# Patient Record
Sex: MALE | Race: WHITE | NOT HISPANIC OR LATINO | Employment: OTHER | URBAN - METROPOLITAN AREA
[De-identification: names, ages, dates, MRNs, and addresses within clinical notes are randomized per-mention and may not be internally consistent; named-entity substitution may affect disease eponyms.]

---

## 2019-01-22 ENCOUNTER — OFFICE VISIT (OUTPATIENT)
Dept: PODIATRY | Facility: CLINIC | Age: 79
End: 2019-01-22
Payer: MEDICARE

## 2019-01-22 VITALS
BODY MASS INDEX: 29.35 KG/M2 | TEMPERATURE: 98 F | HEART RATE: 57 BPM | SYSTOLIC BLOOD PRESSURE: 121 MMHG | HEIGHT: 70 IN | DIASTOLIC BLOOD PRESSURE: 68 MMHG | WEIGHT: 205 LBS

## 2019-01-22 DIAGNOSIS — R20.0 LACK OF SENSATION: ICD-10-CM

## 2019-01-22 DIAGNOSIS — E11.49 TYPE II DIABETES MELLITUS WITH NEUROLOGICAL MANIFESTATIONS: ICD-10-CM

## 2019-01-22 DIAGNOSIS — M19.90 ARTHRITIS: ICD-10-CM

## 2019-01-22 DIAGNOSIS — B35.1 ONYCHOMYCOSIS DUE TO DERMATOPHYTE: ICD-10-CM

## 2019-01-22 PROCEDURE — 99214 PR OFFICE/OUTPT VISIT, EST, LEVL IV, 30-39 MIN: ICD-10-PCS | Mod: S$PBB,,, | Performed by: PODIATRIST

## 2019-01-22 PROCEDURE — 99999 PR PBB SHADOW E&M-NEW PATIENT-LVL III: ICD-10-PCS | Mod: PBBFAC,,, | Performed by: PODIATRIST

## 2019-01-22 PROCEDURE — 99203 OFFICE O/P NEW LOW 30 MIN: CPT | Mod: PBBFAC | Performed by: PODIATRIST

## 2019-01-22 PROCEDURE — 99214 OFFICE O/P EST MOD 30 MIN: CPT | Mod: S$PBB,,, | Performed by: PODIATRIST

## 2019-01-22 PROCEDURE — 99999 PR PBB SHADOW E&M-NEW PATIENT-LVL III: CPT | Mod: PBBFAC,,, | Performed by: PODIATRIST

## 2019-01-22 RX ORDER — PRAVASTATIN SODIUM 40 MG/1
TABLET ORAL
COMMUNITY
End: 2019-01-22

## 2019-01-22 RX ORDER — PRAVASTATIN SODIUM 40 MG/1
40 TABLET ORAL DAILY
COMMUNITY
End: 2021-05-15 | Stop reason: SDUPTHER

## 2019-01-22 RX ORDER — SOTALOL HYDROCHLORIDE 80 MG/1
TABLET ORAL
COMMUNITY
Start: 2018-12-12

## 2019-01-22 RX ORDER — OLMESARTAN MEDOXOMIL 40 MG/1
TABLET ORAL
COMMUNITY
Start: 2018-11-12 | End: 2019-10-22

## 2019-01-22 RX ORDER — BLOOD SUGAR DIAGNOSTIC
STRIP MISCELLANEOUS
Refills: 2 | COMMUNITY
Start: 2019-01-03

## 2019-01-23 NOTE — PROGRESS NOTES
Subjective:      Patient ID: Aaron Johnson is a 78 y.o. male.    Chief Complaint: Diabetic Foot Exam  Patient presents for annual diabetic foot exam.  Relates his diabetes has been doing well, sees Dr. Blackmon, endocrinologist in [place], Dr Augustin locally.  Reports over 20 year history of diabetes.   States it is well controlled, A1c was 6.2.  Checks glucose at home twice a day, last checked was   102.  Does report it can be up in the morning near 160. Has discussed this with his endocrinologist   and was told as long as A1c stays around 6 high morning readings are not a concern.  Denies   burning or tingling in his feet    ROS     Constitutional    Pleasant, well-nourished, no distress, well oriented    Cardiovascular          No chest pain, no shortness of breath    Respiratory         No cough, no congestion     Musculoskeletal        YES arthralgias/joint pain, no back pain, no swelling in the extremities    Neurologic         No weakness, no numbness    Psychiatric   No depression, no anxiety    Endocrine         DIABETES          Objective:      Physical Exam  Vascular         Arterial Pulses Right: posterior tibialis 2/4, dorsalis pedis 2/4, normal CFT   Arterial Pulses Left: posterior tibialis 2/4, dorsalis pedis 2/4, normal CFT   No lower extremity edema bilateral   Pedal skin temperature and color are normal bilateral     Integumentary   Skin is mildly dry, light cracks and peeling, no evidence of tinea pedis, web spaces are clear  No skin breaks, bruises, abrasions bilateral feet  Moderate onychomycosis, several dystrophic, few tender  due to thickness.  Some discoloration,       few reduced thickness, no evidence of ingrown nail, infection or subungual abscess          Neurological   Gross sensation diminished distal digits, intact in all other areas bilateral feet with [place]             [place] monofilament testing.   No paresthesias    Musculoskeletal   Muscle Strength/Testing and Tone:   Intact, normal tone bilateral   Joints, Bones, and Muscles:  Limited range of motion, 1st MPJ, midfoot consistent with arthritic        and degenerative changes        Walks well unassisted          Assessment:       Encounter Diagnoses   Name Primary?    Type II diabetes mellitus with neurological manifestations     Arthritis     Lack of sensation     Onychomycosis due to dermatophyte          Plan:       Aaron MOORE was seen today for diabetic foot exam.    Diagnoses and all orders for this visit:    Type II diabetes mellitus with neurological manifestations    Arthritis    Lack of sensation    Onychomycosis due to dermatophyte        Diabetic pedal exam performed.  Reviewed diabetic education.  We had a lengthy discussion regarding neuropathy, lack of sensation to distal digits, typical for even a   well-controlled diabetic after 20 years.  Advised patient this is a form of neuropathy.  Explained loss of   sensation puts him at risk.  Discussed shoes for indoors to protect his feet and importance of daily foot   checks.   Discussed benefit of tight(er) control of glucose/diabetes regarding potential foot problems especially   neuropathy.  Reviewed no flat shoes, slippers or walking in sock or bare feet.  Discussed maintenance   of dry skin and nails and potential complications.  Reviewed need for daily foot checks and instructed   patient to contact the office with any area of redness or swelling which has not improved within 3 days.  Counseled the patient on his conditions, their implications and medical management.  Instructed patient to contact the office with any changes, questions, concerns, worsening of symptoms.   Patient verbalized understanding.   Total face to face time, exam, assessment, treatment, discussion, documentation 25 minutes, more   than half this time spent on consultation and coordination of care.   Follow up 3 months.      This note was created using [employer] voice recognition software  that occasionally misinterpreted phrases   or words.

## 2019-04-23 ENCOUNTER — OFFICE VISIT (OUTPATIENT)
Dept: PODIATRY | Facility: CLINIC | Age: 79
End: 2019-04-23
Payer: MEDICARE

## 2019-04-23 VITALS
HEIGHT: 70 IN | WEIGHT: 205 LBS | DIASTOLIC BLOOD PRESSURE: 69 MMHG | HEART RATE: 54 BPM | BODY MASS INDEX: 29.35 KG/M2 | RESPIRATION RATE: 18 BRPM | OXYGEN SATURATION: 99 % | SYSTOLIC BLOOD PRESSURE: 122 MMHG | TEMPERATURE: 98 F

## 2019-04-23 DIAGNOSIS — R20.0 LACK OF SENSATION: ICD-10-CM

## 2019-04-23 DIAGNOSIS — M19.90 ARTHRITIS: ICD-10-CM

## 2019-04-23 DIAGNOSIS — E11.49 TYPE II DIABETES MELLITUS WITH NEUROLOGICAL MANIFESTATIONS: Primary | ICD-10-CM

## 2019-04-23 DIAGNOSIS — B35.1 ONYCHOMYCOSIS DUE TO DERMATOPHYTE: ICD-10-CM

## 2019-04-23 PROCEDURE — 99213 OFFICE O/P EST LOW 20 MIN: CPT | Mod: S$PBB,,, | Performed by: PODIATRIST

## 2019-04-23 PROCEDURE — 99999 PR PBB SHADOW E&M-EST. PATIENT-LVL III: CPT | Mod: PBBFAC,,, | Performed by: PODIATRIST

## 2019-04-23 PROCEDURE — 99213 PR OFFICE/OUTPT VISIT, EST, LEVL III, 20-29 MIN: ICD-10-PCS | Mod: S$PBB,,, | Performed by: PODIATRIST

## 2019-04-23 PROCEDURE — 99999 PR PBB SHADOW E&M-EST. PATIENT-LVL III: ICD-10-PCS | Mod: PBBFAC,,, | Performed by: PODIATRIST

## 2019-04-23 PROCEDURE — 99213 OFFICE O/P EST LOW 20 MIN: CPT | Mod: PBBFAC | Performed by: PODIATRIST

## 2019-04-24 NOTE — PROGRESS NOTES
Subjective:      Patient ID: Aaron Johnson is a 79 y.o. male.    Chief Complaint: Diabetic Foot Exam  Patient presents for diabetic foot exam, pain in big toes. Does not a lot of feeling in his toes which we discussed last visit but pain around area of the nails especially on both big toes.  Reports   Recent visit with his endocrinologist, A1c 6.4.  Reports glucose at home have been very well controlled recently 130s in the morning,  in the evening.  Patient has a 20 year history of type 2 diabetes, checks his glucose twice a day      ROS     Constitutional    Pleasant, well-nourished, no distress, well oriented    Cardiovascular          No chest pain, no shortness of breath    Respiratory         No cough, no congestion     Musculoskeletal        YES arthralgias/joint pain, no back pain, no swelling in the extremities    Endocrine         DIABETES        Objective:      Physical Exam  Vascular         Arterial Pulses Right: posterior tibialis 2/4, dorsalis pedis 2/4, normal CFT   Arterial Pulses Left: posterior tibialis 2/4, dorsalis pedis 2/4, normal CFT   No lower extremity edema bilateral   Pedal skin temperature and color are normal bilateral     Integumentary   Hypertrophic 1st MPJ bilateral   Mild dry skin texture, small cracks, stable bilateral   No skin breaks, bruises, abrasions bilateral feet  Moderate onychomycosis, several dystrophic, few tender due to thickness especially hallux with some discoloration, reduced thickness, no evidence of ingrown nail, infection or subungual abscess, at risk           Neurological   Gross sensation diminished distal digits, intact elsewhere, no paresthesias bilateral     Musculoskeletal   Muscle Strength/Testing and Tone:  Intact, normal tone bilateral   Joints, Bones, and Muscles:  Limited range of motion, 1st MPJ, midfoot consistent with arthritic and degenerative changes        Walks well unassisted        Assessment:       Encounter Diagnoses   Name  Primary?    Type II diabetes mellitus with neurological manifestations Yes    Arthritis     Lack of sensation     Onychomycosis due to dermatophyte          Plan:       Aaron MOORE was seen today for diabetic foot exam.    Diagnoses and all orders for this visit:    Type II diabetes mellitus with neurological manifestations    Arthritis    Lack of sensation    Onychomycosis due to dermatophyte      Diabetic pedal exam performed.  Reviewed diabetic education, neuropathy. Reviewed benefit of tight control of glucose/diabetes regarding potential foot problems especially neuropathy.  Reviewed arthritis appropriate shoes,  especially indoors to protect feet, no flat shoes, slippers or walking in sock or bare feet.  Discussed maintenance of skin and nails and potential complications.  Discussed treatments to start immediately should toe become red, swollen or painful. Reviewed need for daily foot checks and instructed patient to contact the office with any area of redness or swelling which has not improved within 3 days.  Counseled the patient on his conditions, their implications and medical management.  Instructed patient to contact the office with any changes, questions, concerns, worsening of symptoms.   Patient verbalized understanding.   Total face to face time, exam, assessment, treatment, discussion, documentation 25 minutes, more than half this time spent on consultation and coordination of care.   Follow up 3 months.      This note was created using [employer] voice recognition software that occasionally misinterpreted phrases or words.

## 2019-07-23 ENCOUNTER — OFFICE VISIT (OUTPATIENT)
Dept: PODIATRY | Facility: CLINIC | Age: 79
End: 2019-07-23
Payer: MEDICARE

## 2019-07-23 DIAGNOSIS — E11.49 TYPE II DIABETES MELLITUS WITH NEUROLOGICAL MANIFESTATIONS: Primary | ICD-10-CM

## 2019-07-23 DIAGNOSIS — L84 FOOT CALLUS: ICD-10-CM

## 2019-07-23 DIAGNOSIS — M19.90 ARTHRITIS: ICD-10-CM

## 2019-07-23 DIAGNOSIS — R20.0 LACK OF SENSATION: ICD-10-CM

## 2019-07-23 DIAGNOSIS — B35.1 ONYCHOMYCOSIS DUE TO DERMATOPHYTE: ICD-10-CM

## 2019-07-23 PROCEDURE — 99211 OFF/OP EST MAY X REQ PHY/QHP: CPT | Mod: PBBFAC | Performed by: PODIATRIST

## 2019-07-23 PROCEDURE — 99999 PR PBB SHADOW E&M-EST. PATIENT-LVL I: ICD-10-PCS | Mod: PBBFAC,,, | Performed by: PODIATRIST

## 2019-07-23 PROCEDURE — 99999 PR PBB SHADOW E&M-EST. PATIENT-LVL I: CPT | Mod: PBBFAC,,, | Performed by: PODIATRIST

## 2019-07-23 PROCEDURE — 99214 OFFICE O/P EST MOD 30 MIN: CPT | Mod: S$PBB,,, | Performed by: PODIATRIST

## 2019-07-23 PROCEDURE — 99214 PR OFFICE/OUTPT VISIT, EST, LEVL IV, 30-39 MIN: ICD-10-PCS | Mod: S$PBB,,, | Performed by: PODIATRIST

## 2019-07-23 NOTE — LETTER
July 23, 2019      Bird Augustin MD  [address]  [facility] [place] [address]           [facility]  [address] [place] [address]  Phone: [phone]  Fax: [phone]          Patient: Aaron Johnson   MR Number: [id number]   YOB: 1940   Date of Visit: 7/23/2019       Dear Dr. Bird Augustin:    Thank you for referring Aaron Johnson to me for evaluation. Attached you will find relevant portions of my assessment and plan of care.    If you have questions, please do not hesitate to call me. I look forward to following Aaron Johnson along with you.    Sincerely,    Jennifer Garcia, DPFAITH    Enclosure  CC:  No Recipients    If you would like to receive this communication electronically, please contact [email or web address] or [phone] to request more information on [employer] Link access.    For providers and/or their staff who would like to refer a patient to [facility], please contact us through our one-stop-shop provider referral line, [facility], at [phone].    If you feel you have received this communication in error or would no longer like to receive these types of communications, please e-mail [email or web address]

## 2019-07-23 NOTE — PROGRESS NOTES
Subjective:      Patient ID: Aaron Johnson is a 79 y.o. male.    Chief Complaint: Diabetic Foot Exam  Patient presents for follow up due to type II diabetes, loss of sensation in feet.  Patient reports no change regarding diabetes since last visit.  Is not aware of his A1c.  Reports glucose this morning was 145, typically it is elevated in the morning.  States glucose ranges are better in the evening, usually .        ROS  Constitutional    Pleasant, well-nourished, no distress, well oriented    Cardiovascular          No chest pain, no shortness of breath    Respiratory         No cough, no congestion     Musculoskeletal        YES arthralgias/joint pain, no swelling in the extremities    Endocrine         DIABETES X 20 YRS        Objective:      Physical Exam   Cardiovascular:   Pulses:       Dorsalis pedis pulses are 2+ on the right side, and 2+ on the left side.        Posterior tibial pulses are 2+ on the right side, and 2+ on the left side.   Musculoskeletal:        Right foot: There is decreased range of motion.        Left foot: There is decreased range of motion.   Feet:   Right Foot:   Protective Sensation: 6 sites tested. 2 sites sensed.   Skin Integrity: Positive for callus.   Left Foot:   Protective Sensation: 6 sites tested. 2 sites sensed.   Skin Integrity: Positive for callus.   Vascular   Normal CFT   No lower extremity edema bilateral   Pedal skin temperature and color are normal bilateral     Integumentary         Hyperkeratotic tissue sub 5th MPJ right foot, medial IPJ left hallux, no discoloration upon debridement of these areas        Dry skin texture   No skin breaks, bruises, abrasions bilateral feet  Moderate onychomycosis, several dystrophic,most severe right hallux, is slowly improving, reduced thickness, no evidence of ingrown nail, infection or subungual abscess, at risk           Neurological   Gross sensation diminished distal digits, intact elsewhere, no paresthesias  bilateral     Musculoskeletal   Muscle Strength/Testing and Tone:  Intact, normal tone bilateral   Joints, Bones, and Muscles:  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    Type II diabetes mellitus with neurological manifestations Yes    Arthritis     Lack of sensation     Foot callus     Onychomycosis due to dermatophyte          Plan:       Aaron MOORE was seen today for diabetic foot exam.    Diagnoses and all orders for this visit:    Type II diabetes mellitus with neurological manifestations    Arthritis    Lack of sensation    Foot callus    Onychomycosis due to dermatophyte      Diabetic pedal exam performed.    Reviewed diabetic education, neuropathy,  lack of sensation, signs /paresthesias to monitor for,  potential complications  Reviewed benefit of tight control of glucose/diabetes regarding potential foot problems especially neuropathy.    Reviewed arthritis appropriate shoes,  especially indoors to protect feet,   Discussed maintenance of skin, nails, calluses and potential complications. Treated areas.  Discussed treatments to start immediately should toe become red, swollen or painful. Reviewed need for daily foot checks and instructed patient to contact the office with any area of redness or swelling which has not improved within 3 days.  Patient was in understanding and agreement with treatment plan  Counseled the patient on his conditions, their implications and medical management.  Instructed patient to contact the office with any changes, questions, concerns, worsening of symptoms.   Patient verbalized understanding.   Total face to face time, exam, assessment, treatment, discussion, documentation 25 minutes, more than half this time spent on consultation and coordination of care.   Follow up 3 months.      This  note was created using [employer] voice recognition software that occasionally misinterpreted phrases or words.

## 2019-10-22 ENCOUNTER — OFFICE VISIT (OUTPATIENT)
Dept: PODIATRY | Facility: CLINIC | Age: 79
End: 2019-10-22
Payer: MEDICARE

## 2019-10-22 VITALS
BODY MASS INDEX: 29.35 KG/M2 | DIASTOLIC BLOOD PRESSURE: 72 MMHG | WEIGHT: 205 LBS | HEART RATE: 74 BPM | SYSTOLIC BLOOD PRESSURE: 125 MMHG | TEMPERATURE: 98 F | OXYGEN SATURATION: 97 % | HEIGHT: 70 IN

## 2019-10-22 DIAGNOSIS — E11.49 TYPE II DIABETES MELLITUS WITH NEUROLOGICAL MANIFESTATIONS: Primary | ICD-10-CM

## 2019-10-22 DIAGNOSIS — B35.1 ONYCHOMYCOSIS DUE TO DERMATOPHYTE: ICD-10-CM

## 2019-10-22 DIAGNOSIS — R20.0 LACK OF SENSATION: ICD-10-CM

## 2019-10-22 DIAGNOSIS — L84 FOOT CALLUS: ICD-10-CM

## 2019-10-22 PROCEDURE — 99999 PR PBB SHADOW E&M-EST. PATIENT-LVL III: CPT | Mod: PBBFAC,,, | Performed by: PODIATRIST

## 2019-10-22 PROCEDURE — 99999 PR PBB SHADOW E&M-EST. PATIENT-LVL III: ICD-10-PCS | Mod: PBBFAC,,, | Performed by: PODIATRIST

## 2019-10-22 PROCEDURE — 99213 OFFICE O/P EST LOW 20 MIN: CPT | Mod: PBBFAC | Performed by: PODIATRIST

## 2019-10-22 PROCEDURE — 99214 OFFICE O/P EST MOD 30 MIN: CPT | Mod: S$PBB,,, | Performed by: PODIATRIST

## 2019-10-22 PROCEDURE — 99214 PR OFFICE/OUTPT VISIT, EST, LEVL IV, 30-39 MIN: ICD-10-PCS | Mod: S$PBB,,, | Performed by: PODIATRIST

## 2019-10-22 RX ORDER — VALSARTAN 160 MG/1
TABLET ORAL
Refills: 3 | COMMUNITY
Start: 2019-10-14 | End: 2021-05-15

## 2019-10-22 NOTE — LETTER
October 23, 2019      Bird Augustin MD  [address]  [facility] [place] [address]           [facility]  [address] [facility] [place] [address]  Phone: [phone]  Fax: [phone]          Patient: Aaron Johnson   MR Number: [id number]   YOB: 1940   Date of Visit: 10/22/2019       Dear Dr. Bird Augustin:    Thank you for referring Aaron Johnson to me for evaluation. Attached you will find relevant portions of my assessment and plan of care.    If you have questions, please do not hesitate to call me. I look forward to following Aaron Johnson along with you.    Sincerely,    Jennifer Garcia, DPFAITH    Enclosure  CC:  No Recipients    If you would like to receive this communication electronically, please contact [email or web address] or [phone] to request more information on [employer] Link access.    For providers and/or their staff who would like to refer a patient to [facility], please contact us through our one-stop-shop provider referral line, [facility], at [phone].    If you feel you have received this communication in error or would no longer like to receive these types of communications, please e-mail [email or web address]

## 2019-10-23 NOTE — PROGRESS NOTES
Subjective:       Patient ID: Aaron Johnson is a 79 y.o. male.    Chief Complaint: Diabetic Foot Exam (nail trim)  Patient presents for follow-up due to type 2 diabetes with lack of sensation in feet.  Patient has a 30-35 year history type 2 diabetes.  He reports no change in medication, treatment or health since last visit.  States diabetes has been well controlled, on the same medication for many years he checks his glucose twice daily, was 151 this morning.  Reports A1c is 6.3.  He states glucose is always higher in the morning.  He is discuss this with his physician many times who pointed out to him as long as his A1c is tightly controlled no change in medication needs to be done at this time.  Denies any foot pain this morning.      Past Medical History:   Diagnosis Date    Arthritis     Atrial fibrillation     Diabetes mellitus     GERD (gastroesophageal reflux disease)     Hyperlipidemia     Hypertension     Low testosterone     Thyroid disease      Past Surgical History:   Procedure Laterality Date    EYE SURGERY      Cataract     History reviewed. No pertinent family history.      Current Outpatient Medications   Medication Sig Dispense Refill    ACCU-CHEK GUIDE Strp TEST BLOOD SUGAR BID  2    amlodipine (NORVASC) 5 MG tablet Take 5 mg by mouth once daily.       ANTIOX#10/OM3/DHA/EPA/LUT/ZEAX (I-CAPS ORAL) Take 1 tablet by mouth once daily.      BIOTIN ORAL Take 1 tablet by mouth once daily.      calcium carbonate (OS-VIRGILIO) 600 mg (1,500 mg) Tab Take 600 mg by mouth once daily.      multivitamin (ONE DAILY MULTIVITAMIN) per tablet Take 1 tablet by mouth once daily.      pantoprazole (PROTONIX) 40 MG tablet Take 40 mg by mouth once daily.       pravastatin (PRAVACHOL) 40 MG tablet Take 40 mg by mouth once daily.      ranitidine (ZANTAC) 150 MG tablet       rivaroxaban (XARELTO) 20 mg Tab Xarelto 20 mg tablet      SITagliptan-metformin (JANUMET XR) 50-1,000 mg TM24 Janumet XR 50  "mg-1,000 mg tablet,extended release      sotalol (BETAPACE) 80 MG tablet       SYNTHROID 25 mcg tablet Take 25 mcg by mouth before breakfast.       testosterone (ANDROGEL) 1 %(50 mg/5 gram) GlPk Apply 5 g topically once daily.      valsartan (DIOVAN) 160 MG tablet TK 1 T PO D  3     No current facility-administered medications for this visit.      Review of patient's allergies indicates:  No Known Allergies    Review of Systems   Constitutional: Negative for activity change, fatigue and fever.   HENT: Negative for congestion.    Respiratory: Negative for cough and shortness of breath.    Cardiovascular: Negative for leg swelling.   Endocrine:        DIABETES 30/35 YEARS   Musculoskeletal: Negative for gait problem.   Neurological: Positive for numbness.       Objective:      Vitals:    10/22/19 0826   BP: 125/72   Pulse: 74   Temp: 98 °F (36.7 °C)   TempSrc: Oral   SpO2: 97%   Weight: 93 kg (205 lb)   Height: 5' 10" (1.778 m)     Physical Exam   Constitutional: He appears well-developed and well-nourished. No distress.   Cardiovascular:   Pulses:       Dorsalis pedis pulses are 2+ on the right side, and 2+ on the left side.        Posterior tibial pulses are 2+ on the right side, and 2+ on the left side.   Musculoskeletal:        Right foot: There is decreased range of motion.        Left foot: There is decreased range of motion.   Feet:   Right Foot:   Protective Sensation: 6 sites tested. 2 sites sensed.   Skin Integrity: Positive for callus.   Left Foot:   Protective Sensation: 6 sites tested. 2 sites sensed.   Skin Integrity: Positive for callus.   Vitals reviewed.  Vascular   Normal CFT   No lower extremity edema bilateral   Pedal skin temperature and color are normal bilateral     Integumentary         Hyperkeratotic tissue sub 5th MPJ right foot, medial IPJ left hallux, no discoloration upon debridement, stable        Dry skin texture   No skin breaks, bruises, abrasions bilateral feet  Moderate " onychomycosis, several dystrophic, most severe right hallux, raised, discolored. 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 Type II diabetes mellitus with neurological manifestations    2. Lack of sensation    3. Foot callus    4. Onychomycosis due to dermatophyte        Plan:           Lengthy review diabetic education, neuropathy,  lack of sensation,  symptoms to monitor for,  potential complications  Reviewed benefit of continued tight control of glucose/diabetes regarding potential foot problems especially neuropathy.    Reviewed arthritis appropriate shoes,  especially indoors to protect feet,   Discussed maintenance of skin, nails, calluses and potential complications.   Discussed  conservative treatments to start immediately should toe become red, swollen or painful. Reviewed need for daily foot checks and instructed patient to contact the office with any area of redness or swelling which has not improved within 3 days.  Patient was in understanding and agreement with treatment plan  Counseled the patient on his conditions, their implications and medical management.  Instructed patient to contact the office with any changes, questions, concerns, worsening of symptoms.   Patient verbalized understanding.   Total face to face time, exam, assessment, treatment, discussion, documentation 25 minutes, more than half this time spent on consultation and coordination of care.   Follow up 3 months.      This note was created using [employer]  voice recognition software that occasionally misinterpreted phrases or words.

## 2019-10-24 ENCOUNTER — TELEPHONE (OUTPATIENT)
Dept: PODIATRY | Facility: CLINIC | Age: 79
End: 2019-10-24

## 2019-10-24 NOTE — TELEPHONE ENCOUNTER
----- Message from rEica Heck sent at 10/24/2019  1:02 PM CDT -----  Contact: Alexia Whitey  Type:  Patient Returning Call    Who Called:  Alexia Whitey  Who Left Message for Patient:  Not sure  Does the patient know what this is regarding?:  Not sure  Best Call Back Number:  [phone] (home)   Additional Information:  na

## 2019-10-24 NOTE — TELEPHONE ENCOUNTER
----- Message from Erica Heck sent at 10/24/2019  1:02 PM CDT -----  Contact: Alexia Whitey  Type:  Patient Returning Call    Who Called:  Alexia Whitey  Who Left Message for Patient:  Not sure  Does the patient know what this is regarding?:  Not sure  Best Call Back Number:  [phone] (home)   Additional Information:  [id number]

## 2019-10-24 NOTE — TELEPHONE ENCOUNTER
----- Message from Erica Heck sent at 10/24/2019  1:02 PM CDT -----  Contact: Alexia Whitey  Type:  Patient Returning Call    Who Called:  Alexia Whitey  Who Left Message for Patient:  Not sure  Does the patient know what this is regarding?:  Not sure  Best Call Back Number:  [phone] (home)   Additional Information:  [id number]

## 2020-01-21 ENCOUNTER — OFFICE VISIT (OUTPATIENT)
Dept: PODIATRY | Facility: CLINIC | Age: 80
End: 2020-01-21
Payer: MEDICARE

## 2020-01-21 VITALS
SYSTOLIC BLOOD PRESSURE: 126 MMHG | TEMPERATURE: 99 F | BODY MASS INDEX: 29.35 KG/M2 | HEART RATE: 70 BPM | HEIGHT: 70 IN | DIASTOLIC BLOOD PRESSURE: 79 MMHG | WEIGHT: 205 LBS | OXYGEN SATURATION: 95 %

## 2020-01-21 DIAGNOSIS — M19.90 ARTHRITIS: ICD-10-CM

## 2020-01-21 DIAGNOSIS — B35.1 ONYCHOMYCOSIS DUE TO DERMATOPHYTE: ICD-10-CM

## 2020-01-21 DIAGNOSIS — M20.5X9 HALLUX LIMITUS, ACQUIRED, UNSPECIFIED LATERALITY: ICD-10-CM

## 2020-01-21 DIAGNOSIS — E11.49 TYPE II DIABETES MELLITUS WITH NEUROLOGICAL MANIFESTATIONS: Primary | ICD-10-CM

## 2020-01-21 PROCEDURE — 99999 PR PBB SHADOW E&M-EST. PATIENT-LVL III: ICD-10-PCS | Mod: PBBFAC,,, | Performed by: PODIATRIST

## 2020-01-21 PROCEDURE — 99213 OFFICE O/P EST LOW 20 MIN: CPT | Mod: PBBFAC | Performed by: PODIATRIST

## 2020-01-21 PROCEDURE — 1126F PR PAIN SEVERITY QUANTIFIED, NO PAIN PRESENT: ICD-10-PCS | Mod: ,,, | Performed by: PODIATRIST

## 2020-01-21 PROCEDURE — 99999 PR PBB SHADOW E&M-EST. PATIENT-LVL III: CPT | Mod: PBBFAC,,, | Performed by: PODIATRIST

## 2020-01-21 PROCEDURE — 99214 OFFICE O/P EST MOD 30 MIN: CPT | Mod: S$PBB,,, | Performed by: PODIATRIST

## 2020-01-21 PROCEDURE — 99214 PR OFFICE/OUTPT VISIT, EST, LEVL IV, 30-39 MIN: ICD-10-PCS | Mod: S$PBB,,, | Performed by: PODIATRIST

## 2020-01-21 PROCEDURE — 1159F PR MEDICATION LIST DOCUMENTED IN MEDICAL RECORD: ICD-10-PCS | Mod: ,,, | Performed by: PODIATRIST

## 2020-01-21 PROCEDURE — 1126F AMNT PAIN NOTED NONE PRSNT: CPT | Mod: ,,, | Performed by: PODIATRIST

## 2020-01-21 PROCEDURE — 1159F MED LIST DOCD IN RCRD: CPT | Mod: ,,, | Performed by: PODIATRIST

## 2020-01-21 NOTE — LETTER
January 21, 2020      Bird Augustin MD  [address]  [facility] [place] [address]           [facility]ound [facility]  [address] [facility] [place] [address]  Phone: [phone]  Fax: [phone]          Patient: Aaron Johnson   MR Number: [id number]   YOB: 1940   Date of Visit: 1/21/2020       Dear Dr. Bird Augustin:    Thank you for referring Aaron Johnson to me for evaluation. Attached you will find relevant portions of my assessment and plan of care.    If you have questions, please do not hesitate to call me. I look forward to following Aaron Johnson along with you.    Sincerely,    Jennifer Garcia, DPFAITH    Enclosure  CC:  No Recipients    If you would like to receive this communication electronically, please contact [email or web address] or [phone] to request more information on [employer] Link access.    For providers and/or their staff who would like to refer a patient to [facility], please contact us through our one-stop-shop provider referral line, [facility] , at [phone].    If you feel you have received this communication in error or would no longer like to receive these types of communications, please e-mail [email or web address]

## 2020-01-22 NOTE — PROGRESS NOTES
Subjective:       Patient ID: Aaron Johnson is a 79 y.o. male.    Chief Complaint: Diabetic Foot Exam  Patient presents for diabetic foot exam. Patient relates he is doing well. No change in diabetes.  States A1c 6.5. Checks glucose twice a day. Relates glucose was 121 lats night 187 this mornring. Has talked to PCP about morning highs, but was told as long as A1c is good flucuations in the am are okay. Denies burning or tingling.       Past Medical History:   Diagnosis Date    Arthritis     Atrial fibrillation     Diabetes mellitus     GERD (gastroesophageal reflux disease)     Hyperlipidemia     Hypertension     Low testosterone     Thyroid disease      Past Surgical History:   Procedure Laterality Date    EYE SURGERY      Cataract     History reviewed. No pertinent family history.      Current Outpatient Medications   Medication Sig Dispense Refill    ACCU-CHEK GUIDE Strp TEST BLOOD SUGAR BID  2    amlodipine (NORVASC) 5 MG tablet Take 5 mg by mouth once daily.       ANTIOX#10/OM3/DHA/EPA/LUT/ZEAX (I-CAPS ORAL) Take 1 tablet by mouth once daily.      multivitamin (ONE DAILY MULTIVITAMIN) per tablet Take 1 tablet by mouth once daily.      pantoprazole (PROTONIX) 40 MG tablet Take 40 mg by mouth once daily.       pravastatin (PRAVACHOL) 40 MG tablet Take 40 mg by mouth once daily.      ranitidine (ZANTAC) 150 MG tablet       rivaroxaban (XARELTO) 20 mg Tab Xarelto 20 mg tablet      SITagliptan-metformin (JANUMET XR) 50-1,000 mg TM24 Janumet XR 50 mg-1,000 mg tablet,extended release      sotalol (BETAPACE) 80 MG tablet       SYNTHROID 25 mcg tablet Take 25 mcg by mouth before breakfast.       valsartan (DIOVAN) 160 MG tablet TK 1 T PO D  3    BIOTIN ORAL Take 1 tablet by mouth once daily.      calcium carbonate (OS-VIRGILIO) 600 mg (1,500 mg) Tab Take 600 mg by mouth once daily.      testosterone (ANDROGEL) 1 %(50 mg/5 gram) GlPk Apply 5 g topically once daily.       No current  "facility-administered medications for this visit.      Review of patient's allergies indicates:  No Known Allergies    Review of Systems   Constitutional: Negative for fever.   HENT: Negative for congestion.    Respiratory: Negative for cough and shortness of breath.    Cardiovascular: Negative for leg swelling.   Musculoskeletal: Negative for gait problem.   All other systems reviewed and are negative.      Objective:      Vitals:    01/21/20 0927   BP: 126/79   Pulse: 70   Temp: 98.7 °F (37.1 °C)   TempSrc: Oral   SpO2: 95%   Weight: 93 kg (205 lb)   Height: 5' 10" (1.778 m)     Physical Exam   Constitutional: He appears well-developed and well-nourished. No distress.   Cardiovascular:   Pulses:       Dorsalis pedis pulses are 2+ on the right side, and 2+ on the left side.        Posterior tibial pulses are 1+ on the right side, and 1+ on the left side.   Pulmonary/Chest: Effort normal.   Musculoskeletal:        Right foot: There is decreased range of motion.        Left foot: There is decreased range of motion.   Feet:   Right Foot:   Protective Sensation: 6 sites tested. 5 sites sensed.   Skin Integrity: Positive for callus.   Left Foot:   Protective Sensation: 6 sites tested. 6 sites sensed.   Skin Integrity: Positive for callus.   Skin: Capillary refill takes 2 to 3 seconds.   Psychiatric: He has a normal mood and affect.   Nursing note and vitals reviewed.  Vascular   CFT 3s bilateral   No lower extremity edema bilateral   Pedal skin temperature and color are normal bilateral     Integumentary         Hyperkeratotic tissue sub 5th MPJ right foot, medial IPJ left hallux, stable, no discoloration upon debridement, stable        Dry skin texture   No skin breaks, bruises, abrasions bilateral feet  Dystrophic nychomycosis, most severe right hallux, raised, discolored. Reduced thickness, no erythema         Neurological   Gross sensation improved from last year's annual diabetic foot exam.  Patient detected " monofilament on all areas bilateral with the exception of the distal right hallux.  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 Type II diabetes mellitus with neurological manifestations    2. Arthritis    3. Hallux limitus, acquired, unspecified laterality    4. Onychomycosis due to dermatophyte        Plan:         Diabetic foot exam performed.   Reviewed monofilament testing, improved sensation since/year's annual exam and loss of sensation distal right great toe.  Reviewed diabetic education  Reviewed benefit of continued tight control of glucose/diabetes   Reviewed arthritis, limited range of motion,  appropriate shoes,  especially indoors to protect feet  Discussed maintenance of dry skin, nails, calluses and potential complications.   Reviewed  conservative treatments to start immediately should toe become red, swollen or painful.   Reviewed need for daily foot checks and instructed patient to contact the office with any area of redness or swelling which has not improved within 3 days.  Patient was in understanding and agreement with treatment plan  Counseled the patient on his conditions, their implications and medical management.  Instructed patient to contact the office with any changes, questions, concerns, worsening of symptoms. Patient verbalized understanding.   Total face to face time, exam, assessment, treatment, discussion, documentation 25 minutes, more than half this time spent on consultation and coordination of care.   Follow up 3 months.      This note was created using [employer] voice recognition software that occasionally misinterpreted phrases or words.

## 2020-04-17 ENCOUNTER — OFFICE VISIT (OUTPATIENT)
Dept: PODIATRY | Facility: CLINIC | Age: 80
End: 2020-04-17
Payer: MEDICARE

## 2020-04-17 VITALS
DIASTOLIC BLOOD PRESSURE: 74 MMHG | HEART RATE: 70 BPM | BODY MASS INDEX: 29.35 KG/M2 | TEMPERATURE: 98 F | SYSTOLIC BLOOD PRESSURE: 139 MMHG | HEIGHT: 70 IN | WEIGHT: 205 LBS

## 2020-04-17 DIAGNOSIS — M20.5X9 HALLUX LIMITUS, ACQUIRED, UNSPECIFIED LATERALITY: ICD-10-CM

## 2020-04-17 DIAGNOSIS — B35.3 TINEA PEDIS OF BOTH FEET: ICD-10-CM

## 2020-04-17 DIAGNOSIS — B35.1 ONYCHOMYCOSIS DUE TO DERMATOPHYTE: ICD-10-CM

## 2020-04-17 DIAGNOSIS — E11.49 TYPE II DIABETES MELLITUS WITH NEUROLOGICAL MANIFESTATIONS: Primary | ICD-10-CM

## 2020-04-17 DIAGNOSIS — L84 FOOT CALLUS: ICD-10-CM

## 2020-04-17 DIAGNOSIS — M19.90 ARTHRITIS: ICD-10-CM

## 2020-04-17 PROCEDURE — 99999 PR PBB SHADOW E&M-EST. PATIENT-LVL III: CPT | Mod: PBBFAC,,, | Performed by: PODIATRIST

## 2020-04-17 PROCEDURE — 99213 OFFICE O/P EST LOW 20 MIN: CPT | Mod: PBBFAC | Performed by: PODIATRIST

## 2020-04-17 PROCEDURE — 99214 PR OFFICE/OUTPT VISIT, EST, LEVL IV, 30-39 MIN: ICD-10-PCS | Mod: S$PBB,,, | Performed by: PODIATRIST

## 2020-04-17 PROCEDURE — 99999 PR PBB SHADOW E&M-EST. PATIENT-LVL III: ICD-10-PCS | Mod: PBBFAC,,, | Performed by: PODIATRIST

## 2020-04-17 PROCEDURE — 99214 OFFICE O/P EST MOD 30 MIN: CPT | Mod: S$PBB,,, | Performed by: PODIATRIST

## 2020-04-17 RX ORDER — LOSARTAN POTASSIUM 100 MG/1
TABLET ORAL
COMMUNITY
Start: 2020-04-09 | End: 2022-10-20 | Stop reason: SDUPTHER

## 2020-04-17 NOTE — LETTER
April 18, 2020      Bird Augustin MD  [address]  [place] [address]           [facility] - [facility]iatry/[facility]ound [facility]  [address] [facility] [place] [address]  Phone: [phone]  Fax: [phone]          Patient: Aaron Johnson   MR Number: [id number]   YOB: 1940   Date of Visit: 4/17/2020       Dear Dr. Bird Augustin:    Thank you for referring Aaron Johnson to me for evaluation. Attached you will find relevant portions of my assessment and plan of care.    If you have questions, please do not hesitate to call me. I look forward to following Aaron Johnson along with you.    Sincerely,    Jennifer Garcia, DPFAITH    Enclosure  CC:  No Recipients    If you would like to receive this communication electronically, please contact [email or web address] or [phone] to request more information on [employer] Link access.    For providers and/or their staff who would like to refer a patient to [facility], please contact us through our one-stop-shop provider referral line, [facility], at [phone].    If you feel you have received this communication in error or would no longer like to receive these types of communications, please e-mail [email or web address]

## 2020-04-18 NOTE — PROGRESS NOTES
Subjective:       Patient ID: Aaron Johnson is a 80 y.o. male.    Chief Complaint: Diabetes Mellitus  Patient presents for Follow-up due to type 2 diabetes.  Patient reports diabetes has remained well controlled.  Last A1c was 6.4, glucose this morning 132. Patient reports with the stay in shelter order for COVID-19 he has been staying at home in working in the [employer] more.  No change in health or medications since last visit.       Past Medical History:   Diagnosis Date    Arthritis     Atrial fibrillation     Diabetes mellitus     GERD (gastroesophageal reflux disease)     Hyperlipidemia     Hypertension     Low testosterone     Thyroid disease      Past Surgical History:   Procedure Laterality Date    EYE SURGERY      Cataract     History reviewed. No pertinent family history.      Current Outpatient Medications   Medication Sig Dispense Refill    ACCU-CHEK GUIDE Strp TEST BLOOD SUGAR BID  2    amlodipine (NORVASC) 5 MG tablet Take 5 mg by mouth once daily.       ANTIOX#10/OM3/DHA/EPA/LUT/ZEAX (I-CAPS ORAL) Take 1 tablet by mouth once daily.      BIOTIN ORAL Take 1 tablet by mouth once daily.      calcium carbonate (OS-VIRGILIO) 600 mg (1,500 mg) Tab Take 600 mg by mouth once daily.      losartan (COZAAR) 100 MG tablet       multivitamin (ONE DAILY MULTIVITAMIN) per tablet Take 1 tablet by mouth once daily.      pantoprazole (PROTONIX) 40 MG tablet Take 40 mg by mouth once daily.       pravastatin (PRAVACHOL) 40 MG tablet Take 40 mg by mouth once daily.      ranitidine (ZANTAC) 150 MG tablet       rivaroxaban (XARELTO) 20 mg Tab Xarelto 20 mg tablet      SITagliptan-metformin (JANUMET XR) 50-1,000 mg TM24 Janumet XR 50 mg-1,000 mg tablet,extended release      sotalol (BETAPACE) 80 MG tablet       SYNTHROID 25 mcg tablet Take 25 mcg by mouth before breakfast.       testosterone (ANDROGEL) 1 %(50 mg/5 gram) GlPk Apply 5 g topically once daily.      valsartan (DIOVAN) 160 MG tablet TK 1 T PO D  " 3     No current facility-administered medications for this visit.      Review of patient's allergies indicates:  No Known Allergies    Review of Systems   Constitutional: Negative for fever.   HENT: Negative for congestion.    Respiratory: Negative for cough and shortness of breath.    Cardiovascular: Negative for leg swelling.   Musculoskeletal: Negative for gait problem.   All other systems reviewed and are negative.      Objective:      Vitals:    04/17/20 0842   BP: 139/74   Pulse: 70   Temp: 98.3 °F (36.8 °C)   Weight: 93 kg (205 lb)   Height: 5' 10" (1.778 m)     Physical Exam   Constitutional: He appears well-developed and well-nourished. No distress.   Cardiovascular:   Pulses:       Dorsalis pedis pulses are 2+ on the right side, and 2+ on the left side.        Posterior tibial pulses are 1+ on the right side, and 1+ on the left side.   Pulmonary/Chest: Effort normal.   Musculoskeletal:        Right foot: There is decreased range of motion.        Left foot: There is decreased range of motion.   Feet:   Right Foot:   Protective Sensation: 4 sites tested. 4 sites sensed.   Skin Integrity: Positive for erythema and dry skin.   Left Foot:   Protective Sensation: 4 sites tested. 4 sites sensed.   Skin Integrity: Positive for erythema and dry skin.   Skin: Capillary refill takes 2 to 3 seconds.   Psychiatric: He has a normal mood and affect.   Nursing note and vitals reviewed.  Vascular   Normal CFT for age bilateral   No lower extremity edema bilateral   Pedal skin temperature and color are normal bilateral     Integumentary         Light erythema with dry peeling skin in a moccasin type distribution consistent with tinea pedis bilateral.  Web spaces are clear and areas of edema or calor        Mild non tender hyperkeratotic tissue sub 5th MPJ right foot, medial IPJ left hallux, stable, no discoloration upon debridement  No skin breaks, bruises, abrasions bilateral feet  Dystrophic onychomycosis, worse on the " right foot, most severe right hallux. Reduced thickness, no erythema         Neurological   Gross sensation iintact bilateral feet with exception of the distal right hallux.  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 Type II diabetes mellitus with neurological manifestations    2. Tinea pedis of both feet    3. Hallux limitus, acquired, unspecified laterality    4. Arthritis    5. Foot callus    6. Onychomycosis due to dermatophyte        Plan:           Reviewed diabetic education  Reviewed A1c, daily glucose, benefit of continued tight control of glucose/diabetes   Reviewed arthritis, limited range of motion,  appropriate shoes to accommodate these areas and appropriate shoes indoors to protect feet  Discussed maintenance of dry skin, nails, calluses and potential complications.   Pointed out athlete's foot throughout the bottom of both feet and instructed patient to utilize over-the-counter antifungal cream once daily.  Make sure it is thoroughly absorbed into the skin and dry before applying socks and shoes, avoid getting between the toes.   Explained this is most likely due to amount of time he has been working outside and when he comes into the house he should apply powder to his feet to absorb any moisture and change his socks.  Reviewed potential complications due to fungal nails, conservative treatments to start immediately should toe become red, swollen or painful.   Reviewed need for daily foot checks and instructed patient to contact the office with any area of redness or swelling which has not improved within 3 days.  Patient was in understanding and agreement with treatment plan  Counseled the  patient on his conditions, their implications and medical management.  Instructed patient to contact the office with any changes, questions, concerns, worsening of symptoms. Patient verbalized understanding.   Total face to face time, exam, assessment, treatment, discussion, documentation 25 minutes, more than half this time spent on consultation and coordination of care.   Follow up 3 months.      This note was created using [employer] voice recognition software that occasionally misinterpreted phrases or words.

## 2020-09-06 ENCOUNTER — HOSPITAL ENCOUNTER (EMERGENCY)
Facility: HOSPITAL | Age: 80
Discharge: HOME | End: 2020-09-06
Attending: EMERGENCY MEDICINE
Payer: MEDICARE

## 2020-09-06 VITALS
HEART RATE: 70 BPM | RESPIRATION RATE: 18 BRPM | BODY MASS INDEX: 28.63 KG/M2 | TEMPERATURE: 99 F | SYSTOLIC BLOOD PRESSURE: 129 MMHG | DIASTOLIC BLOOD PRESSURE: 79 MMHG | OXYGEN SATURATION: 96 % | HEIGHT: 70 IN | WEIGHT: 200 LBS
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
## 2020-09-06 PROCEDURE — 90714 TD VACC NO PRESV 7 YRS+ IM: CPT | Performed by: NURSE PRACTITIONER

## 2020-09-06 PROCEDURE — 99283 EMERGENCY DEPT VISIT LOW MDM: CPT | Mod: 25

## 2020-09-06 PROCEDURE — 63600175 PHARM REV CODE 636 W HCPCS: Performed by: NURSE PRACTITIONER

## 2020-09-06 PROCEDURE — 90471 IMMUNIZATION ADMIN: CPT | Performed by: NURSE PRACTITIONER

## 2020-09-06 RX ORDER — AMOXICILLIN AND CLAVULANATE POTASSIUM 875; 125 MG/1; MG/1
1 TABLET, FILM COATED ORAL 2 TIMES DAILY
Qty: 20 TABLET | Refills: 0 | Status: SHIPPED | OUTPATIENT
Start: 2020-09-06 | End: 2020-09-16

## 2020-09-06 RX ADMIN — TETANUS AND DIPHTHERIA TOXOIDS ADSORBED 0.5 ML: 2; 2 INJECTION INTRAMUSCULAR at 01:09

## 2020-09-06 NOTE — ED NOTES
[facility]. Spoke with Fide Thomas: 54.     Reported that a small mid sized white and tan dog bit pt at his residence in the yard at approximately 1250 today.   Pt residence: [address], [place], [address].     Marcella asks for the patient to call [place] [employer] when he returns home so a report can be made. Relayed information to pt.

## 2020-09-06 NOTE — ED PROVIDER NOTES
Encounter Date: 2020       History     Chief Complaint   Patient presents with    Animal Bite     neighbors dog bit him     80-year-old male presents to ER from home for concerns of dog bite to his left hand, left forearm & right hand PTA; patient states that the neighbor's dog attacked his cat and he attempted to rescue the cat no prescription or OTC medications taken    Past medical/surgical history, allergies & current medications reviewed with patient -- unsure of last tetanus    Known SARS-CoV2 exposure:  No    Room:  11      The history is provided by the patient. No  was used.     Review of patient's allergies indicates:  No Known Allergies  Past Medical History:   Diagnosis Date    Arthritis     Atrial fibrillation     Diabetes mellitus     GERD (gastroesophageal reflux disease)     Hyperlipidemia     Hypertension     Low testosterone     Thyroid disease      Past Surgical History:   Procedure Laterality Date    EYE SURGERY      Cataract     History reviewed. No pertinent family history.  Social History     Tobacco Use    Smoking status: Former Smoker     Years: 10.00     Types: Cigarettes     Quit date: 2/10/1973     Years since quittin.6    Smokeless tobacco: Never Used   Substance Use Topics    Alcohol use: Yes     Comment: scotch 3-4 times a week    Drug use: Not on file     Review of Systems   Constitutional: Negative for fever.   HENT: Negative for sore throat.    Respiratory: Negative for shortness of breath.    Cardiovascular: Negative for chest pain.   Gastrointestinal: Negative for nausea.   Genitourinary: Negative for dysuria.   Musculoskeletal: Negative for back pain.   Skin: Positive for wound. Negative for rash.   Neurological: Negative for weakness.   Hematological: Does not bruise/bleed easily.   All other systems reviewed and are negative.      Physical Exam     Initial Vitals [20 1317]   BP Pulse Resp Temp SpO2   (!) 151/82 75 20 98.5 °F  (36.9 °C) 98 %      MAP       --         Physical Exam    Nursing note and vitals reviewed.  Constitutional: He appears well-developed. He does not appear ill. No distress.   Afebrile, vital signs stable   HENT:   Head: Normocephalic and atraumatic.   Right Ear: External ear normal.   Left Ear: External ear normal.   Nose: Nose normal.   Eyes: Lids are normal.   Neck: Neck supple.   Cardiovascular: Normal rate.   Pulmonary/Chest: Effort normal. No respiratory distress.   Abdominal: He exhibits no distension.   Musculoskeletal:        Arms:         Hands:    Neurological: He is alert.   Skin: No rash noted.   Psychiatric: He has a normal mood and affect.         ED Course   Procedures  Labs Reviewed - No data to display       Imaging Results    None          Medical Decision Making:   ED Management:  We extensively scrubbed and cleaned the wound sites applying a clean sterile dressing, there are no wounds that needed to be repaired, they will heal by secondary intent    Medications given:  Tetanus    Findings, diagnosis & plan of care discussed with patient:  Dog bite; we will prophylactically cover patient with Augmentin, care instructions given -- further instructions given to follow-up with PCP this week    All questions answered, strict return precautions given, patient verbalized understanding to all instructions, pleasant visit -- vital signs are stable & patient is in no distress at discharge    Disclaimer:  This note was prepared with [employer] voice recognition transcription software. Garbled syntax, mangled pronouns, and other bizarre constructions may be attributed to that software system.  Should there be any questions do not hesitate to contact me for clarification.                                   Clinical Impression:       ICD-10-CM ICD-9-CM   1. Dog bite, initial encounter  W54.0XXA 879.8     E906.0             ED Disposition Condition    Discharge Stable        ED Prescriptions      Medication Sig Dispense Start Date End Date Auth. Provider    amoxicillin-clavulanate 875-125mg (AUGMENTIN) 875-125 mg per tablet Take 1 tablet by mouth 2 (two) times daily. for 10 days 20 tablet 9/6/2020 9/16/2020 Lew Mccurdy NP        Follow-up Information     Follow up With Specialties Details Why Contact Info    Bird Augustin MD Family Medicine Go to  This week for follow-up [address]  [facility] [address]  [phone]                                       Lew Mccurdy NP  09/06/20 1321       Lew Mccurdy NP  09/06/20 135       Lew Mccurdy NP  09/06/20 3942

## 2020-09-06 NOTE — DISCHARGE INSTRUCTIONS
Complete all antibiotics as prescribed.  Take OTC Tylenol as needed for pain.  Take OTC probiotic daily or eat 1 small cup of yogurt daily while on antibiotics.  Drink fluids, eat diet as tolerated & rest.    Take all medications as prescribed.      Download the [employer] zulay to access your health records & test results.  Please remember that you had a visit to the emergency room today and this does not substitute as primary care services for ongoing management because emergency services is a snap shot in time.  Should you have any worsening condition that requires emergency services do not hesitate to return to the ER.    COVID-19 TESTING  Hot Line [phone]  [address] [place] [address]  [facility]  Hours: 8am-5pm Monday - Friday   8am-noon Saturday - Sunday

## 2020-10-15 ENCOUNTER — OFFICE VISIT (OUTPATIENT)
Dept: PODIATRY | Facility: CLINIC | Age: 80
End: 2020-10-15
Payer: MEDICARE

## 2020-10-15 VITALS
OXYGEN SATURATION: 99 % | TEMPERATURE: 98 F | DIASTOLIC BLOOD PRESSURE: 75 MMHG | SYSTOLIC BLOOD PRESSURE: 138 MMHG | WEIGHT: 200 LBS | RESPIRATION RATE: 18 BRPM | HEIGHT: 70 IN | HEART RATE: 70 BPM | BODY MASS INDEX: 28.63 KG/M2

## 2020-10-15 DIAGNOSIS — B35.1 ONYCHOMYCOSIS DUE TO DERMATOPHYTE: ICD-10-CM

## 2020-10-15 DIAGNOSIS — E11.9 TYPE II DIABETES MELLITUS, WELL CONTROLLED: Primary | ICD-10-CM

## 2020-10-15 DIAGNOSIS — L84 FOOT CALLUS: ICD-10-CM

## 2020-10-15 DIAGNOSIS — B35.3 TINEA PEDIS OF BOTH FEET: ICD-10-CM

## 2020-10-15 DIAGNOSIS — M20.5X9 HALLUX LIMITUS, ACQUIRED, UNSPECIFIED LATERALITY: ICD-10-CM

## 2020-10-15 PROCEDURE — 99214 OFFICE O/P EST MOD 30 MIN: CPT | Mod: PBBFAC | Performed by: PODIATRIST

## 2020-10-15 PROCEDURE — 99999 PR PBB SHADOW E&M-EST. PATIENT-LVL IV: CPT | Mod: PBBFAC,,, | Performed by: PODIATRIST

## 2020-10-15 PROCEDURE — 99999 PR PBB SHADOW E&M-EST. PATIENT-LVL IV: ICD-10-PCS | Mod: PBBFAC,,, | Performed by: PODIATRIST

## 2020-10-15 PROCEDURE — 99213 PR OFFICE/OUTPT VISIT, EST, LEVL III, 20-29 MIN: ICD-10-PCS | Mod: S$PBB,,, | Performed by: PODIATRIST

## 2020-10-15 PROCEDURE — 99213 OFFICE O/P EST LOW 20 MIN: CPT | Mod: S$PBB,,, | Performed by: PODIATRIST

## 2020-10-15 RX ORDER — LEVOTHYROXINE SODIUM 50 UG/1
TABLET ORAL
COMMUNITY
Start: 2020-10-02 | End: 2020-10-15

## 2020-10-15 NOTE — LETTER
October 16, 2020      Ashlee Manriquez MD  [address]  [place]  [facility] [place] [address]           [facility] - [facility]iatry/Wound [facility]  [address] [facility] [place] [address]  Phone: [phone]  Fax: [phone]          Patient: Aaron Johnson   MR Number: [id number]   YOB: 1940   Date of Visit: 10/15/2020       Dear Dr. Ashlee Manriquez:    Thank you for referring Aaron Johnson to me for evaluation. Attached you will find relevant portions of my assessment and plan of care.    If you have questions, please do not hesitate to call me. I look forward to following Aaron Johnson along with you.    Sincerely,    Jennifer Garcia, DPM    Enclosure  CC:  No Recipients    If you would like to receive this communication electronically, please contact [email or web address] or [phone] to request more information on [employer] Link access.    For providers and/or their staff who would like to refer a patient to [facility], please contact us through our one-stop-shop provider referral line, [facility], at [phone].    If you feel you have received this communication in error or would no longer like to receive these types of communications, please e-mail [email or web address]

## 2020-10-16 NOTE — PROGRESS NOTES
Subjective:       Patient ID: Aaron Johnson is a 80 y.o. male.    Chief Complaint: Diabetic Foot Exam and Follow-up  Patient presents for follow-up due to type 2 diabetes. Relates diabetes remains well controlled.  Last A1c was 6.2, glucose this morning was 130. Patient stays active, has been painting trim. No changes in health or medications since last visit.       Past Medical History:   Diagnosis Date    Arthritis     Atrial fibrillation     Diabetes mellitus     GERD (gastroesophageal reflux disease)     Hyperlipidemia     Hypertension     Low testosterone     Thyroid disease      Past Surgical History:   Procedure Laterality Date    EYE SURGERY      Cataract     No family history on file.      Current Outpatient Medications   Medication Sig Dispense Refill    ACCU-CHEK GUIDE Strp TEST BLOOD SUGAR BID  2    amlodipine (NORVASC) 5 MG tablet Take 5 mg by mouth once daily.       ANTIOX#10/OM3/DHA/EPA/LUT/ZEAX (I-CAPS ORAL) Take 1 tablet by mouth once daily.      BIOTIN ORAL Take 1 tablet by mouth once daily.      calcium carbonate (OS-VIRGILIO) 600 mg (1,500 mg) Tab Take 600 mg by mouth once daily.      losartan (COZAAR) 100 MG tablet       multivitamin (ONE DAILY MULTIVITAMIN) per tablet Take 1 tablet by mouth once daily.      pantoprazole (PROTONIX) 40 MG tablet Take 40 mg by mouth once daily.       pravastatin (PRAVACHOL) 40 MG tablet Take 40 mg by mouth once daily.      ranitidine (ZANTAC) 150 MG tablet       rivaroxaban (XARELTO) 20 mg Tab Xarelto 20 mg tablet      SITagliptan-metformin (JANUMET XR) 50-1,000 mg TM24 Janumet XR 50 mg-1,000 mg tablet,extended release      sotalol (BETAPACE) 80 MG tablet       SYNTHROID 25 mcg tablet Take 25 mcg by mouth before breakfast.       testosterone (ANDROGEL) 1 %(50 mg/5 gram) GlPk Apply 5 g topically once daily.      valsartan (DIOVAN) 160 MG tablet TK 1 T PO D  3     No current facility-administered medications for this visit.      Review of  "patient's allergies indicates:  No Known Allergies    Review of Systems   Constitutional: Negative for fever.   HENT: Negative for congestion.    Respiratory: Negative for cough and shortness of breath.    Cardiovascular: Negative for leg swelling.   Musculoskeletal: Negative for gait problem.   All other systems reviewed and are negative.      Objective:      Vitals:    10/15/20 0833   BP: 138/75   Pulse: 70   Resp: 18   Temp: 98.2 °F (36.8 °C)   TempSrc: Temporal   SpO2: 99%   Weight: 90.7 kg (200 lb)   Height: 5' 10" (1.778 m)     Physical Exam  Vitals signs and nursing note reviewed.   Constitutional:       General: He is not in acute distress.     Appearance: He is well-developed.   Cardiovascular:      Pulses:           Dorsalis pedis pulses are 2+ on the right side and 2+ on the left side.        Posterior tibial pulses are 1+ on the right side and 1+ on the left side.   Pulmonary:      Effort: Pulmonary effort is normal.   Musculoskeletal:      Right foot: Decreased range of motion.      Left foot: Decreased range of motion.   Feet:      Right foot:      Protective Sensation: 4 sites tested. 4 sites sensed.      Skin integrity: Erythema and dry skin present.      Left foot:      Protective Sensation: 4 sites tested. 4 sites sensed.      Skin integrity: Erythema and dry skin present.   Skin:     Capillary Refill: Capillary refill takes 2 to 3 seconds.     Vascular   Normal CFT for age bilateral   No lower extremity edema bilateral   Pedal skin temperature and color are normal bilateral     Integumentary         Light erythema with mildly dry peeling skin, chronic tinea pedis bilateral        Web spaces are clear no areas of edema or calor        Mild hyperkeratotic tissue sub 5th MPJ right foot, medial IPJ left hallux, stable, no discoloration upon debridement  No skin breaks, bruises, abrasions bilateral feet  Dystrophic onychomycosis, right > left foot, most severe right hallux. Reduced thickness, no " ingrown nails         Neurological   Gross sensation intact bilateral feet with exception of the distal right hallux.  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 Type II diabetes mellitus, well controlled    2. Hallux limitus, acquired, unspecified laterality    3. Tinea pedis of both feet    4. Foot callus    5. Onychomycosis due to dermatophyte        Plan:           Reviewed diabetic education, A1c, daily glucose, continued benefit of continued tight control of glucose/diabetes   Reviewed arthritis, limited range of motion  Reviewed appropriate shoes also indoors to protect feet  Discussed maintenance of dry skin/althlete's foot, nails, calluses and potential complications.   Reviewed care of and potential complications due to fungal nails, conservative treatments to start immediately should toe become red, swollen or painful.   Reviewed need for daily foot checks and instructed patient to contact the office with any area of redness or swelling which has not improved within 3 days.  Patient was in understanding and agreement with treatment plan  Counseled the patient on his conditions, their implications and medical management.  Instructed patient to contact the office with any changes, questions, concerns, worsening of symptoms. Patient verbalized understanding.   Total face to face time, exam, assessment, treatment, discussion, documentation 15 minutes, more than half this time spent on consultation and coordination of care.   Follow up 3 months.      This note was created using [employer] voice recognition software that occasionally misinterpreted phrases or words.

## 2021-01-14 ENCOUNTER — OFFICE VISIT (OUTPATIENT)
Dept: PODIATRY | Facility: CLINIC | Age: 81
End: 2021-01-14
Payer: MEDICARE

## 2021-01-14 VITALS
SYSTOLIC BLOOD PRESSURE: 137 MMHG | HEART RATE: 73 BPM | HEIGHT: 70 IN | BODY MASS INDEX: 28.63 KG/M2 | WEIGHT: 200 LBS | RESPIRATION RATE: 16 BRPM | OXYGEN SATURATION: 99 % | DIASTOLIC BLOOD PRESSURE: 75 MMHG | TEMPERATURE: 99 F

## 2021-01-14 DIAGNOSIS — M19.90 ARTHRITIS: ICD-10-CM

## 2021-01-14 DIAGNOSIS — E11.9 TYPE II DIABETES MELLITUS, WELL CONTROLLED: Primary | ICD-10-CM

## 2021-01-14 DIAGNOSIS — B35.1 ONYCHOMYCOSIS DUE TO DERMATOPHYTE: ICD-10-CM

## 2021-01-14 DIAGNOSIS — L84 FOOT CALLUS: ICD-10-CM

## 2021-01-14 DIAGNOSIS — E11.9 COMPREHENSIVE DIABETIC FOOT EXAMINATION, TYPE 2 DM, ENCOUNTER FOR: ICD-10-CM

## 2021-01-14 DIAGNOSIS — M20.5X9 HALLUX LIMITUS, ACQUIRED, UNSPECIFIED LATERALITY: ICD-10-CM

## 2021-01-14 PROCEDURE — 99214 PR OFFICE/OUTPT VISIT, EST, LEVL IV, 30-39 MIN: ICD-10-PCS | Mod: S$PBB,,, | Performed by: PODIATRIST

## 2021-01-14 PROCEDURE — 99214 OFFICE O/P EST MOD 30 MIN: CPT | Mod: S$PBB,,, | Performed by: PODIATRIST

## 2021-01-14 PROCEDURE — 99214 OFFICE O/P EST MOD 30 MIN: CPT | Mod: PBBFAC | Performed by: PODIATRIST

## 2021-01-14 PROCEDURE — 99999 PR PBB SHADOW E&M-EST. PATIENT-LVL IV: ICD-10-PCS | Mod: PBBFAC,,, | Performed by: PODIATRIST

## 2021-01-14 PROCEDURE — 99999 PR PBB SHADOW E&M-EST. PATIENT-LVL IV: CPT | Mod: PBBFAC,,, | Performed by: PODIATRIST

## 2021-05-13 ENCOUNTER — OFFICE VISIT (OUTPATIENT)
Dept: PODIATRY | Facility: CLINIC | Age: 81
End: 2021-05-13
Payer: MEDICARE

## 2021-05-13 VITALS
HEART RATE: 70 BPM | RESPIRATION RATE: 18 BRPM | TEMPERATURE: 98 F | SYSTOLIC BLOOD PRESSURE: 138 MMHG | BODY MASS INDEX: 28.63 KG/M2 | DIASTOLIC BLOOD PRESSURE: 80 MMHG | WEIGHT: 200 LBS | HEIGHT: 70 IN

## 2021-05-13 DIAGNOSIS — E11.9 TYPE II DIABETES MELLITUS, WELL CONTROLLED: Primary | ICD-10-CM

## 2021-05-13 DIAGNOSIS — M20.5X9 HALLUX LIMITUS, ACQUIRED, UNSPECIFIED LATERALITY: ICD-10-CM

## 2021-05-13 DIAGNOSIS — L84 FOOT CALLUS: ICD-10-CM

## 2021-05-13 DIAGNOSIS — M19.90 ARTHRITIS: ICD-10-CM

## 2021-05-13 DIAGNOSIS — B35.1 ONYCHOMYCOSIS DUE TO DERMATOPHYTE: ICD-10-CM

## 2021-05-13 PROCEDURE — 99214 OFFICE O/P EST MOD 30 MIN: CPT | Mod: S$PBB,,, | Performed by: PODIATRIST

## 2021-05-13 PROCEDURE — 99999 PR PBB SHADOW E&M-EST. PATIENT-LVL V: CPT | Mod: PBBFAC,,, | Performed by: PODIATRIST

## 2021-05-13 PROCEDURE — 99215 OFFICE O/P EST HI 40 MIN: CPT | Mod: PBBFAC | Performed by: PODIATRIST

## 2021-05-13 PROCEDURE — 99214 PR OFFICE/OUTPT VISIT, EST, LEVL IV, 30-39 MIN: ICD-10-PCS | Mod: S$PBB,,, | Performed by: PODIATRIST

## 2021-05-13 PROCEDURE — 99999 PR PBB SHADOW E&M-EST. PATIENT-LVL V: ICD-10-PCS | Mod: PBBFAC,,, | Performed by: PODIATRIST

## 2021-05-13 RX ORDER — LOSARTAN POTASSIUM 100 MG/1
100 TABLET ORAL
COMMUNITY
Start: 2020-07-16 | End: 2021-05-15 | Stop reason: SDUPTHER

## 2021-05-13 RX ORDER — IPRATROPIUM BROMIDE 21 UG/1
SPRAY, METERED NASAL
COMMUNITY
Start: 2021-03-25 | End: 2022-03-15

## 2021-05-13 RX ORDER — PRAVASTATIN SODIUM 40 MG/1
TABLET ORAL
COMMUNITY
Start: 2020-08-18

## 2021-05-13 RX ORDER — IPRATROPIUM BROMIDE 21 UG/1
SPRAY, METERED NASAL
COMMUNITY
Start: 2021-03-25 | End: 2021-05-15 | Stop reason: SDUPTHER

## 2021-07-12 ENCOUNTER — TELEPHONE (OUTPATIENT)
Dept: PODIATRY | Facility: CLINIC | Age: 81
End: 2021-07-12

## 2021-07-15 ENCOUNTER — OFFICE VISIT (OUTPATIENT)
Dept: PODIATRY | Facility: CLINIC | Age: 81
End: 2021-07-15
Payer: MEDICARE

## 2021-07-15 ENCOUNTER — HOSPITAL ENCOUNTER (OUTPATIENT)
Dept: RADIOLOGY | Facility: HOSPITAL | Age: 81
Discharge: HOME | End: 2021-07-15
Attending: PODIATRIST
Payer: MEDICARE

## 2021-07-15 VITALS
RESPIRATION RATE: 18 BRPM | SYSTOLIC BLOOD PRESSURE: 137 MMHG | DIASTOLIC BLOOD PRESSURE: 76 MMHG | BODY MASS INDEX: 28.63 KG/M2 | WEIGHT: 200 LBS | HEIGHT: 70 IN | HEART RATE: 70 BPM

## 2021-07-15 DIAGNOSIS — S92.411A CLOSED FRACTURE OF PROXIMAL PHALANX OF RIGHT GREAT TOE, PHYSEAL INVOLVEMENT UNSPECIFIED, INITIAL ENCOUNTER: ICD-10-CM

## 2021-07-15 DIAGNOSIS — S99.921A INJURY OF RIGHT GREAT TOE, INITIAL ENCOUNTER: Primary | ICD-10-CM

## 2021-07-15 DIAGNOSIS — E11.9 TYPE II DIABETES MELLITUS, WELL CONTROLLED: ICD-10-CM

## 2021-07-15 DIAGNOSIS — S99.921A INJURY OF RIGHT GREAT TOE, INITIAL ENCOUNTER: ICD-10-CM

## 2021-07-15 DIAGNOSIS — M19.071 PRIMARY OSTEOARTHRITIS OF RIGHT FOOT: ICD-10-CM

## 2021-07-15 PROCEDURE — 99214 PR OFFICE/OUTPT VISIT, EST, LEVL IV, 30-39 MIN: ICD-10-PCS | Mod: S$PBB,,, | Performed by: PODIATRIST

## 2021-07-15 PROCEDURE — 73630 XR FOOT COMPLETE 3 VIEW RIGHT: ICD-10-PCS | Mod: 26,RT,, | Performed by: RADIOLOGY

## 2021-07-15 PROCEDURE — 99999 PR PBB SHADOW E&M-EST. PATIENT-LVL V: ICD-10-PCS | Mod: PBBFAC,,, | Performed by: PODIATRIST

## 2021-07-15 PROCEDURE — 99215 OFFICE O/P EST HI 40 MIN: CPT | Mod: PBBFAC | Performed by: PODIATRIST

## 2021-07-15 PROCEDURE — 99999 PR PBB SHADOW E&M-EST. PATIENT-LVL V: CPT | Mod: PBBFAC,,, | Performed by: PODIATRIST

## 2021-07-15 PROCEDURE — 73630 X-RAY EXAM OF FOOT: CPT | Mod: 26,RT,, | Performed by: RADIOLOGY

## 2021-07-15 PROCEDURE — 73630 X-RAY EXAM OF FOOT: CPT | Mod: TC,FY,RT

## 2021-07-15 PROCEDURE — 99214 OFFICE O/P EST MOD 30 MIN: CPT | Mod: S$PBB,,, | Performed by: PODIATRIST

## 2021-07-15 RX ORDER — MONTELUKAST SODIUM 10 MG/1
TABLET ORAL
COMMUNITY
Start: 2021-06-15 | End: 2022-03-15

## 2021-07-21 ENCOUNTER — OFFICE VISIT (OUTPATIENT)
Dept: PODIATRY | Facility: CLINIC | Age: 81
End: 2021-07-21
Payer: MEDICARE

## 2021-07-21 ENCOUNTER — HOSPITAL ENCOUNTER (OUTPATIENT)
Dept: RADIOLOGY | Facility: HOSPITAL | Age: 81
Discharge: HOME | End: 2021-07-21
Attending: PODIATRIST
Payer: MEDICARE

## 2021-07-21 VITALS
WEIGHT: 204.38 LBS | SYSTOLIC BLOOD PRESSURE: 126 MMHG | DIASTOLIC BLOOD PRESSURE: 75 MMHG | BODY MASS INDEX: 29.26 KG/M2 | HEIGHT: 70 IN | RESPIRATION RATE: 18 BRPM | HEART RATE: 70 BPM

## 2021-07-21 DIAGNOSIS — S93.491A SPRAIN OF ANTERIOR TALOFIBULAR LIGAMENT OF RIGHT ANKLE, INITIAL ENCOUNTER: ICD-10-CM

## 2021-07-21 DIAGNOSIS — S92.421G: ICD-10-CM

## 2021-07-21 DIAGNOSIS — S93.601A SPRAIN OF RIGHT FOOT, INITIAL ENCOUNTER: ICD-10-CM

## 2021-07-21 DIAGNOSIS — S93.601A SPRAIN OF RIGHT FOOT, INITIAL ENCOUNTER: Primary | ICD-10-CM

## 2021-07-21 PROCEDURE — 99999 PR PBB SHADOW E&M-EST. PATIENT-LVL V: ICD-10-PCS | Mod: PBBFAC,,, | Performed by: PODIATRIST

## 2021-07-21 PROCEDURE — 99999 PR PBB SHADOW E&M-EST. PATIENT-LVL V: CPT | Mod: PBBFAC,,, | Performed by: PODIATRIST

## 2021-07-21 PROCEDURE — 73630 X-RAY EXAM OF FOOT: CPT | Mod: 26,RT,, | Performed by: RADIOLOGY

## 2021-07-21 PROCEDURE — 99215 OFFICE O/P EST HI 40 MIN: CPT | Mod: PBBFAC,PN | Performed by: PODIATRIST

## 2021-07-21 PROCEDURE — 99214 OFFICE O/P EST MOD 30 MIN: CPT | Mod: S$PBB,,, | Performed by: PODIATRIST

## 2021-07-21 PROCEDURE — 99214 PR OFFICE/OUTPT VISIT, EST, LEVL IV, 30-39 MIN: ICD-10-PCS | Mod: S$PBB,,, | Performed by: PODIATRIST

## 2021-07-21 PROCEDURE — 73630 X-RAY EXAM OF FOOT: CPT | Mod: TC,RT

## 2021-07-21 PROCEDURE — 73630 XR FOOT COMPLETE 3 VIEW RIGHT: ICD-10-PCS | Mod: 26,RT,, | Performed by: RADIOLOGY

## 2021-10-15 ENCOUNTER — OFFICE VISIT (OUTPATIENT)
Dept: PODIATRY | Facility: CLINIC | Age: 81
End: 2021-10-15
Payer: MEDICARE

## 2021-10-15 VITALS
SYSTOLIC BLOOD PRESSURE: 141 MMHG | TEMPERATURE: 98 F | BODY MASS INDEX: 28.63 KG/M2 | HEIGHT: 70 IN | DIASTOLIC BLOOD PRESSURE: 85 MMHG | HEART RATE: 71 BPM | WEIGHT: 200 LBS

## 2021-10-15 DIAGNOSIS — E11.9 TYPE II DIABETES MELLITUS, WELL CONTROLLED: Primary | ICD-10-CM

## 2021-10-15 DIAGNOSIS — B35.1 ONYCHOMYCOSIS OF TOENAIL: ICD-10-CM

## 2021-10-15 DIAGNOSIS — M19.90 ARTHRITIS: ICD-10-CM

## 2021-10-15 DIAGNOSIS — L84 FOOT CALLUS: ICD-10-CM

## 2021-10-15 DIAGNOSIS — M20.5X9 HALLUX LIMITUS, ACQUIRED, UNSPECIFIED LATERALITY: ICD-10-CM

## 2021-10-15 PROCEDURE — 99999 PR PBB SHADOW E&M-EST. PATIENT-LVL IV: CPT | Mod: PBBFAC,,, | Performed by: PODIATRIST

## 2021-10-15 PROCEDURE — 99214 PR OFFICE/OUTPT VISIT, EST, LEVL IV, 30-39 MIN: ICD-10-PCS | Mod: S$PBB,,, | Performed by: PODIATRIST

## 2021-10-15 PROCEDURE — 99214 OFFICE O/P EST MOD 30 MIN: CPT | Mod: PBBFAC | Performed by: PODIATRIST

## 2021-10-15 PROCEDURE — 99999 PR PBB SHADOW E&M-EST. PATIENT-LVL IV: ICD-10-PCS | Mod: PBBFAC,,, | Performed by: PODIATRIST

## 2021-10-15 PROCEDURE — 99214 OFFICE O/P EST MOD 30 MIN: CPT | Mod: S$PBB,,, | Performed by: PODIATRIST

## 2022-01-18 ENCOUNTER — OFFICE VISIT (OUTPATIENT)
Dept: PODIATRY | Facility: CLINIC | Age: 82
End: 2022-01-18
Payer: MEDICARE

## 2022-01-18 VITALS
HEIGHT: 70 IN | SYSTOLIC BLOOD PRESSURE: 149 MMHG | DIASTOLIC BLOOD PRESSURE: 78 MMHG | RESPIRATION RATE: 16 BRPM | HEART RATE: 70 BPM | BODY MASS INDEX: 28.63 KG/M2 | WEIGHT: 200 LBS

## 2022-01-18 DIAGNOSIS — B35.1 ONYCHOMYCOSIS OF TOENAIL: ICD-10-CM

## 2022-01-18 DIAGNOSIS — E11.9 COMPREHENSIVE DIABETIC FOOT EXAMINATION, TYPE 2 DM, ENCOUNTER FOR: Primary | ICD-10-CM

## 2022-01-18 DIAGNOSIS — M19.071 PRIMARY OSTEOARTHRITIS OF RIGHT FOOT: ICD-10-CM

## 2022-01-18 DIAGNOSIS — M20.5X9 HALLUX LIMITUS, ACQUIRED, UNSPECIFIED LATERALITY: ICD-10-CM

## 2022-01-18 DIAGNOSIS — L84 FOOT CALLUS: ICD-10-CM

## 2022-01-18 DIAGNOSIS — E11.9 TYPE II DIABETES MELLITUS, WELL CONTROLLED: ICD-10-CM

## 2022-01-18 DIAGNOSIS — M19.90 ARTHRITIS: ICD-10-CM

## 2022-01-18 PROCEDURE — 99214 OFFICE O/P EST MOD 30 MIN: CPT | Mod: PBBFAC | Performed by: PODIATRIST

## 2022-01-18 PROCEDURE — 99999 PR PBB SHADOW E&M-EST. PATIENT-LVL IV: ICD-10-PCS | Mod: PBBFAC,,, | Performed by: PODIATRIST

## 2022-01-18 PROCEDURE — 99999 PR PBB SHADOW E&M-EST. PATIENT-LVL IV: CPT | Mod: PBBFAC,,, | Performed by: PODIATRIST

## 2022-01-18 PROCEDURE — 99214 OFFICE O/P EST MOD 30 MIN: CPT | Mod: S$PBB,,, | Performed by: PODIATRIST

## 2022-01-18 PROCEDURE — 99214 PR OFFICE/OUTPT VISIT, EST, LEVL IV, 30-39 MIN: ICD-10-PCS | Mod: S$PBB,,, | Performed by: PODIATRIST

## 2022-01-20 NOTE — PROGRESS NOTES
Subjective:       Patient ID: Aaron Johnson is a 81 y.o. male.    Chief Complaint: Diabetic Foot Exam  Patient presents for annual diabetic foot exam, history type 2 diabetes.  Patient has a +40 year history type 2 diabetes.  Relates it has been well controlled majority of the time.  Currently A1c is 6.3.  Patient reports glucose was 159 this morning at home.  Denies burning or tingling in feet.  He relates no further problems with area fractured right great toe which occurred last summer.  No significant changes in health or medications.  No foot pain today  History Afib on  Xarelto       Past Medical History:   Diagnosis Date    Arthritis     Atrial fibrillation     Basal cell carcinoma     Diabetes mellitus     GERD (gastroesophageal reflux disease)     Hyperlipidemia     Hypertension     Low testosterone     Pacemaker     Thyroid disease      Past Surgical History:   Procedure Laterality Date    EYE SURGERY      Cataract     History reviewed. No pertinent family history.      Current Outpatient Medications   Medication Sig Dispense Refill    ACCU-CHEK GUIDE Strp TEST BLOOD SUGAR BID  2    amlodipine (NORVASC) 5 MG tablet Take 5 mg by mouth once daily.       ANTIOX#10/OM3/DHA/EPA/LUT/ZEAX (I-CAPS ORAL) Take 1 tablet by mouth once daily.      calcium carbonate (OS-VIRGILIO) 600 mg (1,500 mg) Tab Take 600 mg by mouth once daily.      diazePAM (VALIUM) 10 MG Tab TAKE 1 TABLET BY MOUTH 1 HOUR PRIOR TO MRI      ipratropium (ATROVENT) 21 mcg (0.03 %) nasal spray   See Instructions, 1-2 sprays into each side of nose every 4 hours as needed for drainage, # 1 EA, 12 Refill(s), Pharmacy: [id number], Hoarseness  Vasomotor rhinitis  LPRD (laryngopharyngeal reflux disease), 173, cm, 03/25/21 14:40:0...      losartan (COZAAR) 100 MG tablet       montelukast (SINGULAIR) 10 mg tablet       multivitamin (THERAGRAN) per tablet Take 1 tablet by mouth once daily.      pantoprazole (PROTONIX)  "40 MG tablet Take 40 mg by mouth once daily.       pravastatin (PRAVACHOL) 40 MG tablet   See Instructions, TAKE 1 TABLET AT BEDTIME FOR CHOLESTEROL, # 90 tab, 3 Refill(s), Soft Stop, Pharmacy: [facility] #[id number], 173, cm, 08/07/20 9:51:00 CDT, Height/Length Measured, Weight Dosing      rivaroxaban (XARELTO) 20 mg Tab Xarelto 20 mg tablet      SITagliptan-metformin (JANUMET XR) 50-1,000 mg TM24 Janumet XR 50 mg-1,000 mg tablet,extended release      sotalol (BETAPACE) 80 MG tablet       sulfamethoxazole-trimethoprim 800-160mg (BACTRIM DS) 800-160 mg Tab Take 1 tablet by mouth 2 (two) times daily.      SYNTHROID 25 mcg tablet Take 25 mcg by mouth before breakfast.        No current facility-administered medications for this visit.     Review of patient's allergies indicates:   Allergen Reactions    Clindamycin Rash       Review of Systems   HENT: Negative for congestion.    Respiratory: Negative for cough and shortness of breath.    Cardiovascular: Negative for leg swelling.   Endocrine:        >40 yrs Type II diabetes   Musculoskeletal: Negative for gait problem.   All other systems reviewed and are negative.      Objective:      Vitals:    01/18/22 0855   BP: (!) 149/78   Pulse: 70   Resp: 16   Weight: 90.7 kg (200 lb)   Height: 5' 10" (1.778 m)     Physical Exam  Vitals and nursing note reviewed.   Constitutional:       General: He is not in acute distress.     Appearance: He is well-developed.   Cardiovascular:      Pulses:           Dorsalis pedis pulses are 2+ on the right side and 2+ on the left side.        Posterior tibial pulses are 1+ on the right side and 1+ on the left side.   Pulmonary:      Effort: Pulmonary effort is normal.   Musculoskeletal:         General: No swelling or tenderness.      Right foot: Decreased range of motion (Osteoarthritis right foot.  Hallux limitus bilateral). Prominent metatarsal heads present.      Left foot: Decreased range of motion.      Comments:        "   Feet:      Right foot:      Protective Sensation: 8 sites tested. 8 sites sensed.      Skin integrity: Callus (Mild sub 1st metatarsal head right foot, no discoloration) present. No erythema or dry skin.      Toenail Condition: Right toenails are long. Fungal disease present.     Left foot:      Protective Sensation: 8 sites tested. 8 sites sensed.      Skin integrity: No erythema or dry skin.      Toenail Condition: Left toenails are long. Fungal disease present.  Skin:     Capillary Refill: Capillary refill takes 2 to 3 seconds.   Neurological:      General: No focal deficit present.      Comments: Sensation intact all areas bilateral feet, no painful paresthesias bilateral feet   Psychiatric:         Mood and Affect: Mood normal.         Behavior: Behavior normal.         Musculoskeletal   Muscle Strength/Testing:  Intact  Joints, Bones, and Muscles:  Osteoarthritis 1st MPJ R>L with limited range of motion bilateral   AJ equinus     Arthritic and degenerative changes bilateral feet    Walks well unassisted    Presents in appropriate shoes           Assessment:       1. Comprehensive diabetic foot examination, type 2 DM, encounter for    2. Type II diabetes mellitus, well controlled    3. Arthritis    4. Hallux limitus, acquired, unspecified laterality    5. Primary osteoarthritis of right foot    6. Foot callus - Right Foot    7. Onychomycosis of toenail        Plan:         Diabetic foot exam performed today.    Reviewed results monofilament testing, intact sensation all areas bilateral feet which is excellent considering length of time patient has been diabetic.    We did review signs and symptoms of neuropathy to monitor for  Reviewed diabetic education  Reviewed benefit of continued tight control of glucose/diabetes regarding potential foot problems    Reviewed arthritis in feet, history fracture right great toe, no range of motion 1st MPJ. Make sure her shoes are wide with thick sole for shock absorption.   Should be worn indoors as well  Reviewed appropriate shoes, wide, light, especially worn indoors to protect feet  Discussed maintenance of dry skin, callus and fungal nails and potential complications.    Callus debrided right foot, reassured patient no complications, check area frequently   Nails debrided, thickness reduced. No complications. Discussed use of Vicks  Reviewed need for daily foot checks and instructed patient to contact the office with any area of redness or swelling which has not improved within 3 days.  Patient was in understanding and agreement with treatment plan  Counseled the patient on his conditions, their implications and medical management.  Instructed patient to contact the office with any changes, questions, concerns, worsening of symptoms. Patient verbalized understanding.   Total face to face time, exam, assessment, treatment, discussion, documentation 30 minutes, more than half this time spent on consultation and coordination of care.   Follow up as needed, 3 months      This note was created using [employer] voice recognition software that occasionally misinterpreted phrases or words.

## 2022-04-19 ENCOUNTER — OFFICE VISIT (OUTPATIENT)
Dept: PODIATRY | Facility: CLINIC | Age: 82
End: 2022-04-19
Payer: MEDICARE

## 2022-04-19 VITALS
TEMPERATURE: 99 F | BODY MASS INDEX: 29.72 KG/M2 | WEIGHT: 207.63 LBS | RESPIRATION RATE: 18 BRPM | HEIGHT: 70 IN | SYSTOLIC BLOOD PRESSURE: 136 MMHG | HEART RATE: 74 BPM | DIASTOLIC BLOOD PRESSURE: 76 MMHG

## 2022-04-19 DIAGNOSIS — E11.9 TYPE II DIABETES MELLITUS, WELL CONTROLLED: Primary | ICD-10-CM

## 2022-04-19 DIAGNOSIS — L84 FOOT CALLUS: ICD-10-CM

## 2022-04-19 DIAGNOSIS — B35.1 ONYCHOMYCOSIS OF TOENAIL: ICD-10-CM

## 2022-04-19 DIAGNOSIS — M19.071 PRIMARY OSTEOARTHRITIS OF RIGHT FOOT: ICD-10-CM

## 2022-04-19 PROCEDURE — 99213 OFFICE O/P EST LOW 20 MIN: CPT | Mod: S$PBB,,, | Performed by: PODIATRIST

## 2022-04-19 PROCEDURE — 99213 PR OFFICE/OUTPT VISIT, EST, LEVL III, 20-29 MIN: ICD-10-PCS | Mod: S$PBB,,, | Performed by: PODIATRIST

## 2022-04-19 PROCEDURE — 99999 PR PBB SHADOW E&M-EST. PATIENT-LVL V: CPT | Mod: PBBFAC,,, | Performed by: PODIATRIST

## 2022-04-19 PROCEDURE — 99215 OFFICE O/P EST HI 40 MIN: CPT | Mod: PBBFAC | Performed by: PODIATRIST

## 2022-04-19 PROCEDURE — 99999 PR PBB SHADOW E&M-EST. PATIENT-LVL V: ICD-10-PCS | Mod: PBBFAC,,, | Performed by: PODIATRIST

## 2022-04-19 RX ORDER — CETIRIZINE HYDROCHLORIDE 10 MG/1
10 TABLET ORAL
COMMUNITY
Start: 2021-12-29 | End: 2023-01-19

## 2022-04-19 RX ORDER — MONTELUKAST SODIUM 10 MG/1
10 TABLET ORAL
COMMUNITY
Start: 2022-03-29 | End: 2022-10-20

## 2022-04-19 RX ORDER — LOSARTAN POTASSIUM 100 MG/1
TABLET ORAL
COMMUNITY
Start: 2022-02-15 | End: 2023-01-19 | Stop reason: SDUPTHER

## 2022-04-19 RX ORDER — DEXAMETHASONE 2 MG/1
2 TABLET ORAL
COMMUNITY
Start: 2022-04-18 | End: 2022-04-23

## 2022-04-19 RX ORDER — CEFUROXIME AXETIL 500 MG/1
500 TABLET ORAL
COMMUNITY
Start: 2022-04-18 | End: 2022-05-08

## 2022-04-21 NOTE — PROGRESS NOTES
Subjective:       Patient ID: Aaron Johnson is a 82 y.o. male.    Chief Complaint: Follow-up and Diabetes Mellitus  Patient presents for follow-up due to type 2 diabetes, osteoarthritis 1st MPJ, callus formation.  Patient relates diabetes has been doing fairly well, up a little, recent A1c 6.7.  States glucose was 156 this morning at home.  Has a long history, over 40 years, type 2 diabetes which he states he has always treated seriously.  Always checks glucose twice a day and watches his diet.  History Afib on  Xarelto       Past Medical History:   Diagnosis Date    Arthritis     Atrial fibrillation     Basal cell carcinoma     Diabetes mellitus     GERD (gastroesophageal reflux disease)     Hyperlipidemia     Hypertension     Low testosterone     Pacemaker     Thyroid disease      Past Surgical History:   Procedure Laterality Date    EYE SURGERY      Cataract     History reviewed. No pertinent family history.      Current Outpatient Medications   Medication Sig Dispense Refill    ACCU-CHEK GUIDE Strp TEST BLOOD SUGAR BID  2    amlodipine (NORVASC) 5 MG tablet Take 5 mg by mouth once daily.       calcium carbonate (OS-VIRGILIO) 600 mg (1,500 mg) Tab Take 600 mg by mouth once daily.      cefUROXime (CEFTIN) 500 MG tablet 500 mg.      cetirizine (ZYRTEC) 10 MG tablet 10 mg.      losartan (COZAAR) 100 MG tablet       losartan (COZAAR) 100 MG tablet   = 1 tab, Oral, Daily, # 90 tab, 2 Refill(s), Maintenance, Pharmacy: [facility], 173, cm, 01/18/22 14:01:00 CST, Height/Length Measured, 93.2, kg, 01/18/22 14:01:00 CST, Weight Dosing      montelukast (SINGULAIR) 10 mg tablet 10 mg.      multivitamin (THERAGRAN) per tablet Take 1 tablet by mouth once daily.      pantoprazole (PROTONIX) 40 MG tablet Take 40 mg by mouth once daily.       pravastatin (PRAVACHOL) 40 MG tablet   See Instructions, TAKE 1 TABLET AT BEDTIME FOR CHOLESTEROL, # 90 tab, 3 Refill(s), Soft Stop, Pharmacy: [facility] #[id number], 173, cm, 08/07/20 9:51:00 CDT, Height/Length Measured, Weight Dosing      rivaroxaban (XARELTO) 15 mg Tab 15 mg.      rivaroxaban (XARELTO) 2.5 mg Tab 2.5 mg.      rivaroxaban (XARELTO) 20 mg Tab Xarelto 20 mg tablet      SITagliptan-metformin (JANUMET XR) 50-1,000 mg TM24 Janumet XR 50 mg-1,000 mg tablet,extended release      sotalol (BETAPACE) 80 MG tablet       SYNTHROID 25 mcg tablet Take 25 mcg by mouth before breakfast.       dexAMETHasone (DECADRON) 2 MG tablet 2 mg.      diazePAM (VALIUM) 10 MG Tab TAKE 1 TABLET BY MOUTH 1 HOUR PRIOR TO MRI       No current facility-administered medications for this visit.     Review of patient's allergies indicates:   Allergen Reactions    Clindamycin Rash       Review of Systems   HENT: Negative for congestion.    Respiratory: Negative for cough and shortness of breath.    Cardiovascular: Negative for leg swelling.   Endocrine:        >40 yrs Type II diabetes   Musculoskeletal: Negative for gait problem.   All other systems reviewed and are negative.      Objective:      Vitals:    04/19/22 0924   BP: 136/76   Pulse: 74   Resp: 18   Temp: 98.7 °F (37.1 °C)   Weight: 94.2 kg (207 lb 9.6 oz)   Height: 5' 10" (1.778 m)     Physical Exam  Vitals and nursing note reviewed.   Constitutional:       General: He is not in acute distress.     Appearance: He is well-developed.   Cardiovascular:      Pulses:           Dorsalis pedis pulses are 2+ on the right side and 2+ on the left side.        Posterior tibial pulses are 1+ on the right side and 1+ on the left side.   Pulmonary:      Effort: Pulmonary effort is normal.   Musculoskeletal:         General: No swelling or tenderness.      Right foot: Decreased range of motion (Osteoarthritis right foot.  Hallux limitus bilateral). Prominent metatarsal heads present.      Left foot: Decreased range of motion.      Comments:          Feet:      Right foot:      Skin integrity: Callus (Mild sub 1st metatarsal head " right foot, no discoloration) present. No erythema or dry skin.      Toenail Condition: Right toenails are abnormally thick. Fungal disease present.     Left foot:      Skin integrity: No erythema or dry skin.      Toenail Condition: Left toenails are abnormally thick. Fungal disease present.  Skin:     Capillary Refill: Capillary refill takes 2 to 3 seconds.   Neurological:      General: No focal deficit present.      Comments: no paresthesias bilateral feet   Psychiatric:         Mood and Affect: Mood normal.         Behavior: Behavior normal.         Musculoskeletal   Muscle Strength/Testing:  Intact  Joints, Bones, and Muscles:  Osteoarthritis 1st MPJ R>L with limited range of motion bilateral   AJ equinus     Arthritic and degenerative changes bilateral feet    Walks well unassisted    Presents in appropriate shoes                   Assessment:       1. Type II diabetes mellitus, well controlled    2. Primary osteoarthritis of right foot    3. Foot callus - Right Foot    4. Onychomycosis of toenail        Plan:           Reviewed diabetic education  Reviewed benefit of tight control of glucose/diabetes regarding potential foot problems    Reviewed arthritis in feet, big toe joints, callus  Reviewed appropriate shoes, wide, light, especially worn indoors to protect feet  Discussed maintenance of skin, callus and fungal nails and potential complications.    Callus debrided right foot, reassured patient no complications, check area frequently   Nails debrided, thickness reduced. No complications.  Reviewed need for daily foot checks and instructed patient to contact the office with any area of redness or swelling which has not improved within 3 days.  Patient was in understanding and agreement with treatment plan  Counseled the patient on his conditions, their implications and medical management.  Instructed patient to contact the office with any changes, questions, concerns, worsening of symptoms. Patient  verbalized understanding.   Total face to face time, exam, assessment, treatment, discussion, documentation 20 minutes, more than half this time spent on consultation and coordination of care.   Follow up as needed, 3 months      This note was created using [employer] voice recognition software that occasionally misinterpreted phrases or words.

## 2022-07-19 ENCOUNTER — OFFICE VISIT (OUTPATIENT)
Dept: PODIATRY | Facility: CLINIC | Age: 82
End: 2022-07-19
Payer: MEDICARE

## 2022-07-19 VITALS
WEIGHT: 205.81 LBS | BODY MASS INDEX: 29.46 KG/M2 | HEIGHT: 70 IN | DIASTOLIC BLOOD PRESSURE: 72 MMHG | SYSTOLIC BLOOD PRESSURE: 116 MMHG | HEART RATE: 70 BPM | RESPIRATION RATE: 16 BRPM

## 2022-07-19 DIAGNOSIS — L84 FOOT CALLUS: ICD-10-CM

## 2022-07-19 DIAGNOSIS — B35.1 ONYCHOMYCOSIS OF TOENAIL: ICD-10-CM

## 2022-07-19 DIAGNOSIS — M19.071 PRIMARY OSTEOARTHRITIS OF RIGHT FOOT: Primary | ICD-10-CM

## 2022-07-19 DIAGNOSIS — E11.9 TYPE II DIABETES MELLITUS, WELL CONTROLLED: ICD-10-CM

## 2022-07-19 PROCEDURE — 99213 OFFICE O/P EST LOW 20 MIN: CPT | Mod: S$PBB,,, | Performed by: PODIATRIST

## 2022-07-19 PROCEDURE — 99999 PR PBB SHADOW E&M-EST. PATIENT-LVL V: CPT | Mod: PBBFAC,,, | Performed by: PODIATRIST

## 2022-07-19 PROCEDURE — 99215 OFFICE O/P EST HI 40 MIN: CPT | Mod: PBBFAC | Performed by: PODIATRIST

## 2022-07-19 PROCEDURE — 99213 PR OFFICE/OUTPT VISIT, EST, LEVL III, 20-29 MIN: ICD-10-PCS | Mod: S$PBB,,, | Performed by: PODIATRIST

## 2022-07-19 PROCEDURE — 99999 PR PBB SHADOW E&M-EST. PATIENT-LVL V: ICD-10-PCS | Mod: PBBFAC,,, | Performed by: PODIATRIST

## 2022-07-19 RX ORDER — SULFAMETHOXAZOLE AND TRIMETHOPRIM 800; 160 MG/1; MG/1
TABLET ORAL
COMMUNITY
Start: 2022-07-11 | End: 2022-08-01

## 2022-07-19 RX ORDER — SOTALOL HYDROCHLORIDE 80 MG/1
40 TABLET ORAL
COMMUNITY
Start: 2022-04-27 | End: 2022-10-20 | Stop reason: SDUPTHER

## 2022-07-20 NOTE — PROGRESS NOTES
Subjective:       Patient ID: Aaron Johnson is a 82 y.o. male.    Chief Complaint: Follow-up and Diabetes Mellitus  Patient presents for follow-up due to type 2 diabetes, osteoarthritis 1st MPJ.  Patient relates he has been doing well , no foot pain .  Relates glucose was high this morning, always is high at the beginning of the day.  He checks it morning and evening .  Today glucose was 163 this morning .  Relates it is usually 90 in the evening.    History Afib on  Xarelto       Past Medical History:   Diagnosis Date    Arthritis     Atrial fibrillation     Basal cell carcinoma     Diabetes mellitus     GERD (gastroesophageal reflux disease)     Hyperlipidemia     Hypertension     Low testosterone     Pacemaker     Thyroid disease      Past Surgical History:   Procedure Laterality Date    EYE SURGERY      Cataract     History reviewed. No pertinent family history.      Current Outpatient Medications   Medication Sig Dispense Refill    ACCU-CHEK GUIDE Strp TEST BLOOD SUGAR BID  2    amlodipine (NORVASC) 5 MG tablet Take 5 mg by mouth once daily.       calcium carbonate (OS-VIRGILIO) 600 mg (1,500 mg) Tab Take 600 mg by mouth once daily.      losartan (COZAAR) 100 MG tablet       multivitamin (THERAGRAN) per tablet Take 1 tablet by mouth once daily.      pravastatin (PRAVACHOL) 40 MG tablet   See Instructions, TAKE 1 TABLET AT BEDTIME FOR CHOLESTEROL, # 90 tab, 3 Refill(s), Soft Stop, Pharmacy: [facility] #[id number], 173, cm, 08/07/20 9:51:00 CDT, Height/Length Measured, Weight Dosing      rivaroxaban (XARELTO) 15 mg Tab 15 mg.      SITagliptan-metformin (JANUMET XR) 50-1,000 mg TM24 Janumet XR 50 mg-1,000 mg tablet,extended release      sotalol (BETAPACE) 80 MG tablet       sulfamethoxazole-trimethoprim 800-160mg (BACTRIM DS) 800-160 mg Tab   trimethoprim 1 tab, Oral, BID, X 21 day(s), # 42 tab, 0 Refill(s), Pharmacy: [facility] ([place] #[id number], 1 tab Oral BID,x21 day(s), Chronic  "sinusitis, 173, cm, 07/11/22 13:01:00 CDT, Height/Length Measured, 94.3, kg, 07/11/22 13:01:00 CDT, Weight Dosing      SYNTHROID 25 mcg tablet Take 25 mcg by mouth before breakfast.       cetirizine (ZYRTEC) 10 MG tablet 10 mg.      diazePAM (VALIUM) 10 MG Tab TAKE 1 TABLET BY MOUTH 1 HOUR PRIOR TO MRI      losartan (COZAAR) 100 MG tablet   = 1 tab, Oral, Daily, # 90 tab, 2 Refill(s), Maintenance, Pharmacy: [facility], 173, cm, 01/18/22 14:01:00 CST, Height/Length Measured, 93.2, kg, 01/18/22 14:01:00 CST, Weight Dosing      montelukast (SINGULAIR) 10 mg tablet 10 mg.      pantoprazole (PROTONIX) 40 MG tablet Take 40 mg by mouth once daily.       rivaroxaban (XARELTO) 2.5 mg Tab 2.5 mg.      rivaroxaban (XARELTO) 20 mg Tab Xarelto 20 mg tablet      sotaloL (BETAPACE) 80 MG tablet 40 mg.       No current facility-administered medications for this visit.     Review of patient's allergies indicates:   Allergen Reactions    Clindamycin Rash       Review of Systems   HENT: Negative for congestion.    Respiratory: Negative for cough and shortness of breath.    Cardiovascular: Negative for leg swelling.   Endocrine:        >40 yrs Type II diabetes   Musculoskeletal: Negative for gait problem.   All other systems reviewed and are negative.      Objective:      Vitals:    07/19/22 0932   BP: 116/72   Pulse: 70   Resp: 16   Weight: 93.4 kg (205 lb 12.8 oz)   Height: 5' 10" (1.778 m)     Physical Exam  Vitals and nursing note reviewed.   Constitutional:       General: He is not in acute distress.     Appearance: He is well-developed.   Cardiovascular:      Pulses:           Dorsalis pedis pulses are 2+ on the right side and 2+ on the left side.        Posterior tibial pulses are 1+ on the right side and 1+ on the left side.   Pulmonary:      Effort: Pulmonary effort is normal.   Musculoskeletal:         General: No tenderness.      Right foot: Decreased range of motion (Osteoarthritis right foot.  Hallux " limitus bilateral). Prominent metatarsal heads present.      Left foot: Decreased range of motion.   Feet:      Right foot:      Skin integrity: Callus (Mild sub 1st metatarsal head right foot, no discoloration) present. No erythema or dry skin.      Toenail Condition: Right toenails are abnormally thick and long. Fungal disease present.     Left foot:      Skin integrity: No erythema or dry skin.      Toenail Condition: Left toenails are abnormally thick and long. Fungal disease present.  Skin:     Capillary Refill: Capillary refill takes 2 to 3 seconds.   Neurological:      General: No focal deficit present.   Psychiatric:         Mood and Affect: Mood normal.         Behavior: Behavior normal.         Musculoskeletal   Muscle Strength/Testing:  Intact  Joints, Bones, and Muscles:  Osteoarthritis 1st MPJ R>L with limited range of motion bilateral   AJ equinus     Arthritic and degenerative changes bilateral feet    Walks well unassisted    Presents in appropriate shoes                           Assessment:       1. Primary osteoarthritis of right foot    2. Foot callus - Right Foot    3. Type II diabetes mellitus, well controlled    4. Onychomycosis of toenail        Plan:           Reviewed osteoarthritis, limited range of motion 1st MPJ right greater than left foot.  Discussed how this contributes to pressure on the nails.  Reviewed wide appropriate shoes, thick sole for shock absorption, light weight soft material  Reviewed diabetic education  Reviewed benefit of tight control of glucose/diabetes   Reviewed appropriate shoes, wide, light, especially worn indoors to protect feet  Discussed maintenance of skin, callus and fungal nails and potential complications.    Advised patient to use over-the-counter athlete's foot cream right great toe for dry skin, make sure it is thoroughly absorbed into the skin and nail, dry before he puts on shoes and socks, would recommend application in the evening  Callus much  improved, debrided right foot, reassured patient no complications, check area frequently   Nails debrided, thickness reduced. No complications.  Reviewed need for daily foot checks and instructed patient to contact the office with any area of redness or swelling which has not improved within 3 days.  Patient was in understanding and agreement with treatment plan  Counseled the patient on his conditions, their implications and medical management.  Instructed patient to contact the office with any changes, questions, concerns, worsening of symptoms. Patient verbalized understanding.   Total face to face time, exam, assessment, treatment, discussion, documentation 20 minutes, more than half this time spent on consultation and coordination of care.   Follow up as needed, 3 months      This note was created using [employer] voice recognition software that occasionally misinterpreted phrases or words.

## 2022-10-18 ENCOUNTER — OFFICE VISIT (OUTPATIENT)
Dept: PODIATRY | Facility: CLINIC | Age: 82
End: 2022-10-18
Payer: MEDICARE

## 2022-10-18 VITALS
DIASTOLIC BLOOD PRESSURE: 66 MMHG | RESPIRATION RATE: 16 BRPM | BODY MASS INDEX: 28.18 KG/M2 | SYSTOLIC BLOOD PRESSURE: 120 MMHG | WEIGHT: 196.88 LBS | HEART RATE: 75 BPM | HEIGHT: 70 IN

## 2022-10-18 DIAGNOSIS — M19.071 PRIMARY OSTEOARTHRITIS OF RIGHT FOOT: ICD-10-CM

## 2022-10-18 DIAGNOSIS — L84 FOOT CALLUS: ICD-10-CM

## 2022-10-18 DIAGNOSIS — E11.9 TYPE II DIABETES MELLITUS, WELL CONTROLLED: Primary | ICD-10-CM

## 2022-10-18 DIAGNOSIS — B35.1 ONYCHOMYCOSIS OF TOENAIL: ICD-10-CM

## 2022-10-18 PROCEDURE — 99215 OFFICE O/P EST HI 40 MIN: CPT | Mod: PBBFAC | Performed by: PODIATRIST

## 2022-10-18 PROCEDURE — 99999 PR PBB SHADOW E&M-EST. PATIENT-LVL V: ICD-10-PCS | Mod: PBBFAC,,, | Performed by: PODIATRIST

## 2022-10-18 PROCEDURE — 99999 PR PBB SHADOW E&M-EST. PATIENT-LVL V: CPT | Mod: PBBFAC,,, | Performed by: PODIATRIST

## 2022-10-18 PROCEDURE — 99213 PR OFFICE/OUTPT VISIT, EST, LEVL III, 20-29 MIN: ICD-10-PCS | Mod: S$PBB,,, | Performed by: PODIATRIST

## 2022-10-18 PROCEDURE — 99213 OFFICE O/P EST LOW 20 MIN: CPT | Mod: S$PBB,,, | Performed by: PODIATRIST

## 2022-10-18 RX ORDER — ACETAMINOPHEN 500 MG
5000 TABLET ORAL DAILY
COMMUNITY

## 2022-10-18 RX ORDER — EMPAGLIFLOZIN, METFORMIN HYDROCHLORIDE 10; 1000 MG/1; MG/1
1 TABLET, EXTENDED RELEASE ORAL 2 TIMES DAILY
COMMUNITY

## 2022-10-18 RX ORDER — ZINC GLUCONATE 50 MG
50 TABLET ORAL DAILY
COMMUNITY

## 2022-10-20 NOTE — PROGRESS NOTES
Subjective:       Patient ID: Aaron Johnson is a 82 y.o. male.    Chief Complaint: Follow-up, Nail Problem, Callouses, and Diabetes Mellitus  Patient presents for follow-up due to type 2 diabetes, osteoarthritis 1st MPJ. Patient relates he has been doing well, no falls. No pain due to arthritis/callus right foot confirms wearing appropriate shoes at all times usually indoors as well.  Reports no change in diabetes, well controlled with glucose 130 this morning at home.    History Afib on  Xarelto       Past Medical History:   Diagnosis Date    Arthritis     Atrial fibrillation     Basal cell carcinoma     Diabetes mellitus     GERD (gastroesophageal reflux disease)     Hyperlipidemia     Hypertension     Low testosterone     Pacemaker     Thyroid disease      Past Surgical History:   Procedure Laterality Date    EYE SURGERY      Cataract     History reviewed. No pertinent family history.      Current Outpatient Medications   Medication Sig Dispense Refill    ACCU-CHEK GUIDE Strp TEST BLOOD SUGAR BID  2    amlodipine (NORVASC) 5 MG tablet Take 5 mg by mouth once daily.       calcium carbonate (OS-VIRGILIO) 600 mg (1,500 mg) Tab Take 600 mg by mouth once daily.      cetirizine (ZYRTEC) 10 MG tablet 10 mg.      cholecalciferol, vitamin D3, (VITAMIN D3) 125 mcg (5,000 unit) Tab Take 5,000 Units by mouth once daily.      empagliflozin-metformin (SYNJARDY XR) 10-1,000 mg TBph Take 1 tablet by mouth 2 (two) times a day. Patient takes 1 tablet am 1 tablet at night      empagliflozin-metformin 12.5-500 mg Tab   1 tab, Oral, BID, with meals, # 180 tab, 0 Refill(s)      guaiFENesin (MUCINEX) 600 mg 12 hr tablet 600 mg.      losartan (COZAAR) 100 MG tablet   = 1 tab, Oral, Daily, # 90 tab, 2 Refill(s), Maintenance, Pharmacy: [facility], 173, cm, 01/18/22 14:01:00 CST, Height/Length Measured, 93.2, kg, 01/18/22 14:01:00 CST, Weight Dosing      multivitamin (THERAGRAN) per tablet Take 1 tablet by mouth once daily.       "pantoprazole (PROTONIX) 40 MG tablet Take 40 mg by mouth once daily.       pravastatin (PRAVACHOL) 40 MG tablet   See Instructions, TAKE 1 TABLET AT BEDTIME FOR CHOLESTEROL, # 90 tab, 3 Refill(s), Soft Stop, Pharmacy: [facility] #[id number], 173, cm, 08/07/20 9:51:00 CDT, Height/Length Measured, Weight Dosing      rivaroxaban (XARELTO) 15 mg Tab 15 mg.      sotalol (BETAPACE) 80 MG tablet       SYNTHROID 25 mcg tablet Take 25 mcg by mouth before breakfast.       zinc gluconate 50 mg tablet Take 50 mg by mouth once daily.       No current facility-administered medications for this visit.     Review of patient's allergies indicates:   Allergen Reactions    Clindamycin Rash       Review of Systems   HENT:  Negative for congestion.    Respiratory:  Negative for cough and shortness of breath.    Cardiovascular:  Negative for leg swelling.   Endocrine:        >40 yrs Type II diabetes   Musculoskeletal:  Negative for gait problem.   All other systems reviewed and are negative.    Objective:      Vitals:    10/18/22 0926   BP: 120/66   Pulse: 75   Resp: 16   Weight: 89.3 kg (196 lb 14.4 oz)   Height: 5' 10" (1.778 m)     Physical Exam  Vitals and nursing note reviewed.   Constitutional:       General: He is not in acute distress.     Appearance: He is well-developed.   Cardiovascular:      Pulses:           Dorsalis pedis pulses are 2+ on the right side and 2+ on the left side.        Posterior tibial pulses are 1+ on the right side and 1+ on the left side.   Pulmonary:      Effort: Pulmonary effort is normal.   Musculoskeletal:      Right foot: Decreased range of motion (Osteoarthritis right foot.  Hallux limitus bilateral). Prominent metatarsal heads present.      Left foot: Decreased range of motion.   Feet:      Right foot:      Skin integrity: Callus (Mild sub 1st metatarsal head right foot, no discoloration) and dry skin present. No erythema.      Toenail Condition: Right toenails are abnormally thick and long. " Fungal disease present.     Left foot:      Skin integrity: Dry skin present. No erythema.      Toenail Condition: Left toenails are abnormally thick and long. Fungal disease present.  Skin:     Capillary Refill: Capillary refill takes 2 to 3 seconds.   Neurological:      General: No focal deficit present.   Psychiatric:         Mood and Affect: Mood normal.         Behavior: Behavior normal.         Thought Content: Thought content normal.         Judgment: Judgment normal.       Musculoskeletal   Muscle Strength/Testing:  Intact  Joints, Bones, and Muscles:  Osteoarthritis 1st MPJ R>L with limited range of motion bilateral   AJ equinus     Arthritic and degenerative changes bilateral feet    Walks well unassisted    Presents in appropriate shoes                                         Assessment:       1. Type II diabetes mellitus, well controlled    2. Primary osteoarthritis of right foot    3. Foot callus - Right Foot    4. Onychomycosis of toenail        Plan:         Reviewed diabetic education  Reviewed benefit of tight control of glucose/diabetes   Reviewed osteoarthritis, limited range of motion 1st MPJ right greater than left foot.    Reviewed appropriate shoes, wide, light, especially worn indoors to protect feet  Discussed maintenance of skin, callus and fungal nails and potential complications.   Advised patient callus right foot is much improved, however dry skin has progressed and we discussed several over-the-counter topical cream/lotion which should be applied to his feet each night before bed   Nails debrided, thickness reduced. No complications.  Reviewed need for daily foot checks and instructed patient to contact the office with any area of redness or swelling which has not improved within 3 days.  Patient was in understanding and agreement with treatment plan  Counseled the patient on his conditions, their implications and medical management.  Instructed patient to contact the office with any  changes, questions, concerns, worsening of symptoms. Patient verbalized understanding.   Total face to face time, exam, assessment, treatment, discussion, documentation 20 minutes, more than half this time spent on consultation and coordination of care.   Follow up as needed, 3 months      This note was created using [employer] voice recognition software that occasionally misinterpreted phrases or words.

## 2023-01-19 ENCOUNTER — OFFICE VISIT (OUTPATIENT)
Dept: PODIATRY | Facility: CLINIC | Age: 83
End: 2023-01-19
Payer: MEDICARE

## 2023-01-19 VITALS
DIASTOLIC BLOOD PRESSURE: 81 MMHG | HEIGHT: 70 IN | BODY MASS INDEX: 28.63 KG/M2 | WEIGHT: 200 LBS | HEART RATE: 70 BPM | SYSTOLIC BLOOD PRESSURE: 143 MMHG | RESPIRATION RATE: 16 BRPM

## 2023-01-19 DIAGNOSIS — M19.071 PRIMARY OSTEOARTHRITIS OF RIGHT FOOT: ICD-10-CM

## 2023-01-19 DIAGNOSIS — M20.5X9 HALLUX LIMITUS, ACQUIRED, UNSPECIFIED LATERALITY: ICD-10-CM

## 2023-01-19 DIAGNOSIS — E11.9 TYPE II DIABETES MELLITUS, WELL CONTROLLED: ICD-10-CM

## 2023-01-19 DIAGNOSIS — E11.9 COMPREHENSIVE DIABETIC FOOT EXAMINATION, TYPE 2 DM, ENCOUNTER FOR: Primary | ICD-10-CM

## 2023-01-19 DIAGNOSIS — B35.1 ONYCHOMYCOSIS OF TOENAIL: ICD-10-CM

## 2023-01-19 PROCEDURE — 99999 PR PBB SHADOW E&M-EST. PATIENT-LVL IV: CPT | Mod: PBBFAC,,, | Performed by: PODIATRIST

## 2023-01-19 PROCEDURE — 99999 PR PBB SHADOW E&M-EST. PATIENT-LVL IV: ICD-10-PCS | Mod: PBBFAC,,, | Performed by: PODIATRIST

## 2023-01-19 PROCEDURE — 99214 OFFICE O/P EST MOD 30 MIN: CPT | Mod: PBBFAC | Performed by: PODIATRIST

## 2023-01-19 PROCEDURE — 99213 PR OFFICE/OUTPT VISIT, EST, LEVL III, 20-29 MIN: ICD-10-PCS | Mod: S$PBB,,, | Performed by: PODIATRIST

## 2023-01-19 PROCEDURE — 99213 OFFICE O/P EST LOW 20 MIN: CPT | Mod: S$PBB,,, | Performed by: PODIATRIST

## 2023-01-19 RX ORDER — PANTOPRAZOLE SODIUM 40 MG/1
TABLET, DELAYED RELEASE ORAL
COMMUNITY
Start: 2022-10-31 | End: 2023-01-19 | Stop reason: SDUPTHER

## 2023-01-19 RX ORDER — ALBUTEROL SULFATE 90 UG/1
1 AEROSOL, METERED RESPIRATORY (INHALATION)
COMMUNITY
Start: 2022-12-13

## 2023-01-19 RX ORDER — LOSARTAN POTASSIUM 100 MG/1
TABLET ORAL
COMMUNITY
Start: 2022-11-06

## 2023-01-19 NOTE — PROGRESS NOTES
Subjective:       Patient ID: Aaron Johnson is a 82 y.o. male.    Chief Complaint: Follow-up and Diabetic Foot Exam  Patient presents for follow up diabetic foot exam,  type 2 diabetes, osteoarthritis 1st MPJ. Patient relates he has been doing well, diabetes remains well controlled, has a 40 yr history of diabetes.  No pain due to arthritis/callus right foot confirms wearing appropriate shoes at all times usually indoors as well.    Relates recent A1c done 4-6 weeks ago was 6.7. Glucose 162 this morning at home.    History Afib on  Xarelto       Past Medical History:   Diagnosis Date    Arthritis     Atrial fibrillation     Basal cell carcinoma     Diabetes mellitus     GERD (gastroesophageal reflux disease)     Hyperlipidemia     Hypertension     Low testosterone     Pacemaker     Thyroid disease      Past Surgical History:   Procedure Laterality Date    EYE SURGERY      Cataract     History reviewed. No pertinent family history.      Current Outpatient Medications   Medication Sig Dispense Refill    ACCU-CHEK GUIDE Strp TEST BLOOD SUGAR BID  2    amlodipine (NORVASC) 5 MG tablet Take 5 mg by mouth once daily.       calcium carbonate (OS-VIRGILIO) 600 mg (1,500 mg) Tab Take 600 mg by mouth once daily.      cholecalciferol, vitamin D3, 125 mcg (5,000 unit) Tab Take 5,000 Units by mouth once daily.      empagliflozin-metformin (SYNJARDY XR) 10-1,000 mg TBph Take 1 tablet by mouth 2 (two) times a day. Patient takes 1 tablet am 1 tablet at night      losartan (COZAAR) 100 MG tablet   = 1 tab, Oral, Daily, FOR BLOOD PRESSURE., # 90 tab, 3 Refill(s), Maintenance, Pharmacy: [facility], 173, cm, 10/04/22 14:04:00 CDT, Height/Length Measured, 91.2, kg, 10/04/22 14:04:00 CDT, Weight Dosing      multivitamin (THERAGRAN) per tablet Take 1 tablet by mouth once daily.      pravastatin (PRAVACHOL) 40 MG tablet   See Instructions, TAKE 1 TABLET AT BEDTIME FOR CHOLESTEROL, # 90 tab, 3 Refill(s), Soft Stop, Pharm[facility]:  [id number], 173, cm, 08/07/20 9:51:00 CDT, Height/Length Measured, Weight Dosing      rivaroxaban (XARELTO) 15 mg Tab 15 mg.      sotalol (BETAPACE) 80 MG tablet       SYNTHROID 25 mcg tablet Take 25 mcg by mouth before breakfast.       zinc gluconate 50 mg tablet Take 50 mg by mouth once daily.      albuterol (PROVENTIL/VENTOLIN HFA) 90 mcg/actuation inhaler 1 puff.       No current facility-administered medications for this visit.     Review of patient's allergies indicates:   Allergen Reactions    Clindamycin Rash       Review of Systems   HENT:  Negative for congestion.    Respiratory:  Negative for cough.    Cardiovascular:  Negative for leg swelling.   Endocrine:        >40 yrs Type II diabetes   Musculoskeletal:  Negative for gait problem.   All other systems reviewed and are negative.    Objective:      Vitals:    01/19/23 0923   BP: (!) 143/81   Pulse: 70   Resp: 16   Weight: 90.7 kg (200 lb)   Height: 5' 10" (1.778 m)     Physical Exam  Vitals and nursing note reviewed.   Constitutional:       General: He is not in acute distress.     Appearance: He is well-developed.   Cardiovascular:      Pulses:           Dorsalis pedis pulses are 2+ on the right side and 2+ on the left side.        Posterior tibial pulses are 1+ on the right side and 1+ on the left side.   Pulmonary:      Effort: Pulmonary effort is normal.   Musculoskeletal:         General: No tenderness.      Right foot: Decreased range of motion (Osteoarthritis right foot.  Hallux limitus bilateral). Prominent metatarsal heads present.      Left foot: Decreased range of motion.   Feet:      Right foot:      Protective Sensation: 6 sites tested.  5 sites sensed.      Skin integrity: Dry skin (mildly dry skin texture) present. No erythema or callus.      Toenail Condition: Right toenails are abnormally thick and long. Fungal disease present.     Left foot:      Protective Sensation: 6 sites tested.  5 sites sensed.      Skin " integrity: Dry skin present. No erythema.      Toenail Condition: Left toenails are abnormally thick and long. Fungal disease present.  Skin:     Capillary Refill: Capillary refill takes 2 to 3 seconds.   Neurological:      General: No focal deficit present.      Comments: Did not detect monofilament distal feet hallux bilateral, did detect elsewhere bilateral feet. No pain. Paresthesia's bilateral feet   Psychiatric:         Mood and Affect: Mood normal.         Behavior: Behavior normal.         Thought Content: Thought content normal.         Judgment: Judgment normal.       Musculoskeletal   Muscle strength is intact  Joints, Bones, and Muscles:  Osteoarthritis 1st MPJ R>L with limited range of motion bilateral   AJ equinus     Arthritic and degenerative changes bilateral feet    Walks well unassisted    Presents in appropriate shoes                         Assessment:       1. Comprehensive diabetic foot examination, type 2 DM, encounter for    2. Type II diabetes mellitus, well controlled    3. Primary osteoarthritis of right foot    4. Hallux limitus, acquired, unspecified laterality    5. Onychomycosis of toenail        Plan:           Comprehensive diabetic pedal exam performed  Reviewed results monofilament test, lack of protective sensation tip of both big toes and reviewed potential complications  Explained lack of feeling to the big toes is a sign of neuropathy  Reviewed diabetic education  Discussed benefit of tight control of glucose/diabetes  Reviewed osteoarthritis, limited range of motion 1st MPJ right greater than left foot.    Reviewed appropriate shoes, wide, light, worn indoors to protect feet  Discussed maintenance of skin and fungal nails and potential complications.   Nails debrided, thickness reduced. No complications.  Reviewed need for daily foot checks and instructed patient to contact the office with any area of redness or swelling which has not improved within 3 days.  Patient was in  understanding and agreement with treatment plan  Counseled the patient on his conditions, their implications and medical management.  Instructed patient to contact the office with any changes, questions, concerns, worsening of symptoms. Patient verbalized understanding.   Total face to face time, exam, assessment, treatment, discussion, documentation 20 minutes, more than half this time spent on consultation and coordination of care.   Follow up as needed, 3 months      This note was created using [employer] voice recognition software that occasionally misinterpreted phrases or words.

## 2023-04-21 ENCOUNTER — OFFICE VISIT (OUTPATIENT)
Dept: PODIATRY | Facility: CLINIC | Age: 83
End: 2023-04-21
Payer: MEDICARE

## 2023-04-21 VITALS
SYSTOLIC BLOOD PRESSURE: 144 MMHG | DIASTOLIC BLOOD PRESSURE: 84 MMHG | BODY MASS INDEX: 28.95 KG/M2 | HEIGHT: 70 IN | HEART RATE: 73 BPM | RESPIRATION RATE: 16 BRPM | WEIGHT: 202.19 LBS

## 2023-04-21 DIAGNOSIS — E11.9 TYPE II DIABETES MELLITUS, WELL CONTROLLED: ICD-10-CM

## 2023-04-21 DIAGNOSIS — B35.1 ONYCHOMYCOSIS OF TOENAIL: ICD-10-CM

## 2023-04-21 DIAGNOSIS — M19.071 PRIMARY OSTEOARTHRITIS OF RIGHT FOOT: Primary | ICD-10-CM

## 2023-04-21 DIAGNOSIS — M20.5X9 HALLUX LIMITUS, ACQUIRED, UNSPECIFIED LATERALITY: ICD-10-CM

## 2023-04-21 PROCEDURE — 99999 PR PBB SHADOW E&M-EST. PATIENT-LVL V: CPT | Mod: PBBFAC,,, | Performed by: PODIATRIST

## 2023-04-21 PROCEDURE — 99213 OFFICE O/P EST LOW 20 MIN: CPT | Mod: S$PBB,,, | Performed by: PODIATRIST

## 2023-04-21 PROCEDURE — 99999 PR PBB SHADOW E&M-EST. PATIENT-LVL V: ICD-10-PCS | Mod: PBBFAC,,, | Performed by: PODIATRIST

## 2023-04-21 PROCEDURE — 99215 OFFICE O/P EST HI 40 MIN: CPT | Mod: PBBFAC | Performed by: PODIATRIST

## 2023-04-21 PROCEDURE — 99213 PR OFFICE/OUTPT VISIT, EST, LEVL III, 20-29 MIN: ICD-10-PCS | Mod: S$PBB,,, | Performed by: PODIATRIST

## 2023-04-23 NOTE — PROGRESS NOTES
Subjective:       Patient ID: Aaron Johnson is a 83 y.o. male.    Chief Complaint: Follow-up and Diabetes Mellitus  Patient presents for follow up  type 2 diabetes, osteoarthritis 1st MPJ. 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  Melani diabetes remains well controlled, A1c 6.7.  Glucose 155 this morning.  Denies burning or tingling in feet.      Past Medical History:   Diagnosis Date    Arthritis     Atrial fibrillation     Basal cell carcinoma     Diabetes mellitus     GERD (gastroesophageal reflux disease)     Hyperlipidemia     Hypertension     Low testosterone     Pacemaker     Thyroid disease      Past Surgical History:   Procedure Laterality Date    EYE SURGERY      Cataract     History reviewed. No pertinent family history.      Current Outpatient Medications   Medication Sig Dispense Refill    ACCU-CHEK GUIDE Strp TEST BLOOD SUGAR BID  2    albuterol (PROVENTIL/VENTOLIN HFA) 90 mcg/actuation inhaler 1 puff.      amlodipine (NORVASC) 5 MG tablet Take 5 mg by mouth once daily.       calcium carbonate (OS-VIRGILIO) 600 mg (1,500 mg) Tab Take 600 mg by mouth once daily.      cholecalciferol, vitamin D3, 125 mcg (5,000 unit) Tab Take 5,000 Units by mouth once daily.      empagliflozin-metformin (SYNJARDY XR) 10-1,000 mg TBph Take 1 tablet by mouth 2 (two) times a day. Patient takes 1 tablet am 1 tablet at night      ipratropium (ATROVENT) 42 mcg (0.06 %) nasal spray 2 sprays by Each Nostril route 3 (three) times daily.      losartan (COZAAR) 100 MG tablet   = 1 tab, Oral, Daily, FOR BLOOD PRESSURE., # 90 tab, 3 Refill(s), Maintenance, Pharmacy: [facility], 173, cm, 10/04/22 14:04:00 CDT, Height/Length Measured, 91.2, kg, 10/04/22 14:04:00 CDT, Weight Dosing      montelukast (SINGULAIR) 10 mg tablet Take 10 mg by mouth.      multivitamin (THERAGRAN) per tablet Take 1 tablet by mouth once daily.      pantoprazole  "(PROTONIX) 40 MG tablet TAKE 1 TABLET BY MOUTH DAILY AS NEEDED HEARTBURN      pravastatin (PRAVACHOL) 40 MG tablet   See Instructions, TAKE 1 TABLET AT BEDTIME FOR CHOLESTEROL, # 90 tab, 3 Refill(s), Soft Stop, Pharmacy: [id number], 173, cm, 08/07/20 9:51:00 CDT, Height/Length Measured, Weight Dosing      rivaroxaban (XARELTO) 15 mg Tab 15 mg.      sotalol (BETAPACE) 80 MG tablet       SYNTHROID 25 mcg tablet Take 25 mcg by mouth before breakfast.       zinc gluconate 50 mg tablet Take 50 mg by mouth once daily.       No current facility-administered medications for this visit.     Review of patient's allergies indicates:   Allergen Reactions    Clindamycin Rash       Review of Systems   HENT:  Negative for congestion.    Respiratory:  Negative for cough.    Cardiovascular:  Negative for leg swelling.   Endocrine:        >40 yrs Type II diabetes   Musculoskeletal:  Negative for gait problem.   All other systems reviewed and are negative.    Objective:      Vitals:    04/21/23 0927   BP: (!) 144/84   Pulse: 73   Resp: 16   Weight: 91.7 kg (202 lb 3.2 oz)   Height: 5' 10" (1.778 m)     Physical Exam  Vitals and nursing note reviewed.   Constitutional:       General: He is not in acute distress.     Appearance: Normal appearance. He is well-developed.   Cardiovascular:      Pulses:           Dorsalis pedis pulses are 2+ on the right side and 2+ on the left side.        Posterior tibial pulses are 1+ on the right side and 1+ on the left side.   Pulmonary:      Effort: Pulmonary effort is normal.   Musculoskeletal:      Right foot: Decreased range of motion (Osteoarthritis right foot.  Hallux limitus bilateral). Prominent metatarsal heads present.      Left foot: Decreased range of motion.   Feet:      Right foot:      Skin integrity: Dry skin (mildly dry skin texture) present.      Toenail Condition: Right toenails are abnormally thick and long. Fungal disease present.     Left foot:      Skin " integrity: Dry skin present.      Toenail Condition: Left toenails are abnormally thick and long. Fungal disease present.  Skin:     Capillary Refill: Capillary refill takes 2 to 3 seconds.   Neurological:      General: No focal deficit present.      Mental Status: He is alert.   Psychiatric:         Mood and Affect: Mood normal.         Behavior: Behavior normal.         Thought Content: Thought content normal.         Judgment: Judgment normal.       Musculoskeletal   Muscle strength is intact  Joints, Bones, and Muscles:  Osteoarthritis 1st MPJ R>L with limited range of motion bilateral   AJ equinus     Arthritic and degenerative changes bilateral feet    Walks well unassisted    Presents in appropriate shoes                         Assessment:       1. Primary osteoarthritis of right foot    2. Hallux limitus, acquired, unspecified laterality    3. Type II diabetes mellitus, well controlled    4. Onychomycosis of toenail        Plan:           Reviewed diabetic education  Discussed benefit of tight control of glucose/diabetes  Reviewed osteoarthritis, limited range of motion 1st MPJ right greater than left foot.  Topical treatments    Reviewed appropriate shoes  Reviewed maintenance of dry skin and fungal nails   Nails debrided, thickness reduced  No complications  Reviewed need for daily foot checks and instructed patient to contact the office with any area of redness or swelling which has not improved within 3 days.  Patient was in understanding and agreement with treatment plan.  I counseled the patient on their conditions, implications and medical management.  Instructed patient/family to contact the office with any changes, questions, concerns, worsening of symptoms.   Total face to face time 20 minutes, exam, assessment, treatment, discussion, additional time for review of chart prior to and following appointment and visit documentation, consultation and coordination of care.   Follow up 3 months    This  note was created using [employer] voice recognition software that occasionally misinterpreted phrases or words.

## 2023-07-20 ENCOUNTER — OFFICE VISIT (OUTPATIENT)
Dept: PODIATRY | Facility: CLINIC | Age: 83
End: 2023-07-20
Payer: MEDICARE

## 2023-07-20 VITALS
DIASTOLIC BLOOD PRESSURE: 71 MMHG | HEIGHT: 70 IN | RESPIRATION RATE: 16 BRPM | HEART RATE: 70 BPM | BODY MASS INDEX: 28.63 KG/M2 | WEIGHT: 200 LBS | SYSTOLIC BLOOD PRESSURE: 129 MMHG

## 2023-07-20 DIAGNOSIS — E11.9 TYPE II DIABETES MELLITUS, WELL CONTROLLED: ICD-10-CM

## 2023-07-20 DIAGNOSIS — M19.071 PRIMARY OSTEOARTHRITIS OF RIGHT FOOT: Primary | ICD-10-CM

## 2023-07-20 DIAGNOSIS — M20.5X9 HALLUX LIMITUS, ACQUIRED, UNSPECIFIED LATERALITY: ICD-10-CM

## 2023-07-20 DIAGNOSIS — B35.1 ONYCHOMYCOSIS OF TOENAIL: ICD-10-CM

## 2023-07-20 PROCEDURE — 99999 PR PBB SHADOW E&M-EST. PATIENT-LVL V: ICD-10-PCS | Mod: PBBFAC,,, | Performed by: PODIATRIST

## 2023-07-20 PROCEDURE — 99213 OFFICE O/P EST LOW 20 MIN: CPT | Mod: S$PBB,,, | Performed by: PODIATRIST

## 2023-07-20 PROCEDURE — 99999 PR PBB SHADOW E&M-EST. PATIENT-LVL V: CPT | Mod: PBBFAC,,, | Performed by: PODIATRIST

## 2023-07-20 PROCEDURE — 99215 OFFICE O/P EST HI 40 MIN: CPT | Mod: PBBFAC | Performed by: PODIATRIST

## 2023-07-20 PROCEDURE — 99213 PR OFFICE/OUTPT VISIT, EST, LEVL III, 20-29 MIN: ICD-10-PCS | Mod: S$PBB,,, | Performed by: PODIATRIST

## 2023-07-24 NOTE — PROGRESS NOTES
Subjective:       Patient ID: Aaron Johnson is a 83 y.o. male.    Chief Complaint: Follow-up and Diabetes Mellitus  Patient presents for follow up  type 2 diabetes, osteoarthritis 1st MPJ. Patient relates he has been doing well, stays active as he can in this heat. Works on indoor projects. Relates diabetes has remained well controlled, last A1c 6.7.  Glucose 148 this morning, always higher in the morning.  Denies burning or tingling in feet.      Past Medical History:   Diagnosis Date    Arthritis     Atrial fibrillation     Basal cell carcinoma     Diabetes mellitus     GERD (gastroesophageal reflux disease)     Hyperlipidemia     Hypertension     Low testosterone     Pacemaker     Thyroid disease      Past Surgical History:   Procedure Laterality Date    EYE SURGERY      Cataract     History reviewed. No pertinent family history.      Current Outpatient Medications   Medication Sig Dispense Refill    ACCU-CHEK GUIDE Strp TEST BLOOD SUGAR BID  2    albuterol (PROVENTIL/VENTOLIN HFA) 90 mcg/actuation inhaler 1 puff.      amlodipine (NORVASC) 5 MG tablet Take 5 mg by mouth once daily.       calcium carbonate (OS-VIRGILIO) 600 mg (1,500 mg) Tab Take 600 mg by mouth once daily.      cholecalciferol, vitamin D3, 125 mcg (5,000 unit) Tab Take 5,000 Units by mouth once daily.      empagliflozin-metformin (SYNJARDY XR) 10-1,000 mg TBph Take 1 tablet by mouth 2 (two) times a day. Patient takes 1 tablet am 1 tablet at night      ipratropium (ATROVENT) 42 mcg (0.06 %) nasal spray 2 sprays by Each Nostril route 3 (three) times daily.      losartan (COZAAR) 100 MG tablet   = 1 tab, Oral, Daily, FOR BLOOD PRESSURE., # 90 tab, 3 Refill(s), Maintenance, Pharmacy: [facility], 173, cm, 10/04/22 14:04:00 CDT, Height/Length Measured, 91.2, kg, 10/04/22 14:04:00 CDT, Weight Dosing      montelukast (SINGULAIR) 10 mg tablet Take 10 mg by mouth.      multivitamin (THERAGRAN) per tablet Take 1 tablet by mouth once daily.       "pantoprazole (PROTONIX) 40 MG tablet TAKE 1 TABLET BY MOUTH DAILY AS NEEDED HEARTBURN      pravastatin (PRAVACHOL) 40 MG tablet   See Instructions, TAKE 1 TABLET AT BEDTIME FOR CHOLESTEROL, # 90 tab, 3 Refill(s), Soft Stop, Pharmacy: [facility] #[id number], 173, cm, 08/07/20 9:51:00 CDT, Height/Length Measured, Weight Dosing      rivaroxaban (XARELTO) 15 mg Tab 15 mg.      sotalol (BETAPACE) 80 MG tablet       SYNTHROID 25 mcg tablet Take 25 mcg by mouth before breakfast.       tamsulosin (FLOMAX) 0.4 mg Cap 0.4 mg.      zinc gluconate 50 mg tablet Take 50 mg by mouth once daily.       No current facility-administered medications for this visit.     Review of patient's allergies indicates:   Allergen Reactions    Clindamycin Rash       Review of Systems   Cardiovascular:  Negative for leg swelling.   Endocrine:        >40 yrs Type II diabetes   Musculoskeletal:  Negative for gait problem.   All other systems reviewed and are negative.    Objective:      Vitals:    07/20/23 0932   BP: 129/71   Pulse: 70   Resp: 16   Weight: 90.7 kg (200 lb)   Height: 5' 10" (1.778 m)     Physical Exam  Vitals and nursing note reviewed.   Constitutional:       General: He is not in acute distress.     Appearance: Normal appearance. He is well-developed.   Cardiovascular:      Pulses:           Dorsalis pedis pulses are 2+ on the right side and 2+ on the left side.        Posterior tibial pulses are 1+ on the right side and 1+ on the left side.   Pulmonary:      Effort: Pulmonary effort is normal.   Musculoskeletal:      Right foot: Decreased range of motion (Osteoarthritis right foot.  Hallux limitus bilateral). Prominent metatarsal heads present.      Left foot: Decreased range of motion.   Feet:      Right foot:      Skin integrity: Dry skin (mildly dry skin texture) present.      Toenail Condition: Right toenails are abnormally thick and long. Fungal disease present.     Left foot:      Skin integrity: Dry skin present.      " Toenail Condition: Left toenails are abnormally thick and long. Fungal disease present.  Skin:     Capillary Refill: Capillary refill takes 2 to 3 seconds.   Neurological:      General: No focal deficit present.      Mental Status: He is alert.   Psychiatric:         Behavior: Behavior normal.         Thought Content: Thought content normal.       Musculoskeletal   Osteoarthritis 1st MPJ R>L with limited range of motion bilateral   AJ equinus     Arthritic and degenerative changes bilateral feet    Presents in appropriate shoes                       Assessment:       1. Primary osteoarthritis of right foot    2. Type II diabetes mellitus, well controlled    3. Hallux limitus, acquired, unspecified laterality    4. Onychomycosis of toenail          Plan:           Reviewed diabetic education  Discussed benefit of tight control of glucose/diabetes  Reviewed osteoarthritis, limited range of motion 1st MPJ right greater than left foot.    Reviewed appropriate shoes, even indoors, are the most important treatment  Reviewed OTC voltaren gel as dircted  Reviewed maintenance of dry skin and fungal nails   Nails debrided, thickness reduced  No complications  Reviewed need for daily foot checks and instructed patient to contact the office with any area of redness or swelling which has not improved within 3 days.  Patient was in understanding and agreement with treatment plan.  I counseled the patient on their conditions, implications and medical management.  Instructed patient to contact the office with any changes, questions, concerns, worsening of symptoms.   Total face to face time 20 minutes, exam, assessment, treatment, discussion, additional time for review of chart prior to and following appointment and visit documentation, consultation and coordination of care.   Follow up 3 months    This note was created using [employer] voice recognition software that occasionally misinterpreted phrases or words.

## 2023-10-26 ENCOUNTER — OFFICE VISIT (OUTPATIENT)
Dept: PODIATRY | Facility: CLINIC | Age: 83
End: 2023-10-26
Payer: MEDICARE

## 2023-10-26 VITALS
HEIGHT: 70 IN | HEART RATE: 70 BPM | SYSTOLIC BLOOD PRESSURE: 132 MMHG | WEIGHT: 200 LBS | BODY MASS INDEX: 28.63 KG/M2 | DIASTOLIC BLOOD PRESSURE: 77 MMHG

## 2023-10-26 DIAGNOSIS — M20.5X9 HALLUX LIMITUS, ACQUIRED, UNSPECIFIED LATERALITY: ICD-10-CM

## 2023-10-26 DIAGNOSIS — E11.9 TYPE II DIABETES MELLITUS, WELL CONTROLLED: ICD-10-CM

## 2023-10-26 DIAGNOSIS — M19.071 PRIMARY OSTEOARTHRITIS OF RIGHT FOOT: ICD-10-CM

## 2023-10-26 DIAGNOSIS — M79.675 PAIN OF GREAT TOE, LEFT: Primary | ICD-10-CM

## 2023-10-26 DIAGNOSIS — B35.1 ONYCHOMYCOSIS OF TOENAIL: ICD-10-CM

## 2023-10-26 PROCEDURE — 99213 PR OFFICE/OUTPT VISIT, EST, LEVL III, 20-29 MIN: ICD-10-PCS | Mod: S$PBB,,, | Performed by: PODIATRIST

## 2023-10-26 PROCEDURE — 99999 PR PBB SHADOW E&M-EST. PATIENT-LVL V: CPT | Mod: PBBFAC,,, | Performed by: PODIATRIST

## 2023-10-26 PROCEDURE — 99999 PR PBB SHADOW E&M-EST. PATIENT-LVL V: ICD-10-PCS | Mod: PBBFAC,,, | Performed by: PODIATRIST

## 2023-10-26 PROCEDURE — 99213 OFFICE O/P EST LOW 20 MIN: CPT | Mod: S$PBB,,, | Performed by: PODIATRIST

## 2023-10-26 PROCEDURE — 99215 OFFICE O/P EST HI 40 MIN: CPT | Mod: PBBFAC | Performed by: PODIATRIST

## 2023-10-26 RX ORDER — METFORMIN HYDROCHLORIDE 500 MG/1
1000 TABLET, EXTENDED RELEASE ORAL 2 TIMES DAILY
COMMUNITY
Start: 2023-07-26

## 2023-10-26 RX ORDER — EMPAGLIFLOZIN 25 MG/1
25 TABLET, FILM COATED ORAL EVERY MORNING
COMMUNITY
Start: 2023-10-18 | End: 2023-10-26

## 2023-10-26 RX ORDER — METFORMIN HYDROCHLORIDE 500 MG/1
1000 TABLET ORAL
COMMUNITY
Start: 2023-08-22 | End: 2023-10-26 | Stop reason: ALTCHOICE

## 2023-10-26 RX ORDER — FAMOTIDINE 20 MG/1
TABLET, FILM COATED ORAL
COMMUNITY
Start: 2023-10-23

## 2023-10-26 RX ORDER — HYDROCHLOROTHIAZIDE 12.5 MG/1
12.5 TABLET ORAL
COMMUNITY
Start: 2023-08-30 | End: 2023-10-26 | Stop reason: SDUPTHER

## 2023-10-26 RX ORDER — HYDROCHLOROTHIAZIDE 12.5 MG/1
12.5 TABLET ORAL
COMMUNITY
Start: 2023-08-28

## 2023-10-26 RX ORDER — NEOMYCIN SULFATE, POLYMYXIN B SULFATE, AND DEXAMETHASONE 3.5; 10000; 1 MG/G; [USP'U]/G; MG/G
OINTMENT OPHTHALMIC 2 TIMES DAILY
COMMUNITY
Start: 2023-10-09

## 2023-10-26 NOTE — PROGRESS NOTES
Subjective:       Patient ID: Aaron Johnson is a 83 y.o. male.    Chief Complaint: Follow-up and Diabetes Mellitus  Patient presents for follow up  type 2 diabetes, osteoarthritis 1st MPJ. Patient relates he has been doing well, usually stays active but had recent eye lid surgery and has not been able to do anything for the last 10 days, hoping to get stitches out tomorrow.  Reports feet have been doing well.  Relates only pain he has been having is pressure/discomfort left great toenail when in shoes.  Reports diabetes doing fairly well, glucose was elevated this morning at 166.  Reports last A1c 6.7      Past Medical History:   Diagnosis Date    Arthritis     Atrial fibrillation     Basal cell carcinoma     Diabetes mellitus     GERD (gastroesophageal reflux disease)     Hyperlipidemia     Hypertension     Low testosterone     Pacemaker     Thyroid disease      Past Surgical History:   Procedure Laterality Date    EYE SURGERY      Cataract     History reviewed. No pertinent family history.      Current Outpatient Medications   Medication Sig Dispense Refill    ACCU-CHEK GUIDE Strp TEST BLOOD SUGAR BID  2    albuterol (PROVENTIL/VENTOLIN HFA) 90 mcg/actuation inhaler 1 puff.      amlodipine (NORVASC) 5 MG tablet Take 5 mg by mouth once daily.       calcium carbonate (OS-VIRGILIO) 600 mg (1,500 mg) Tab Take 600 mg by mouth once daily.      cholecalciferol, vitamin D3, 125 mcg (5,000 unit) Tab Take 5,000 Units by mouth once daily.      empagliflozin-metformin (SYNJARDY XR) 10-1,000 mg TBph Take 1 tablet by mouth 2 (two) times a day. Patient takes 1 tablet am 1 tablet at night      famotidine (PEPCID) 20 MG tablet SMARTSI Tablet(s) By Mouth Every Evening      hydroCHLOROthiazide (HYDRODIURIL) 12.5 MG Tab 12.5 mg.      ipratropium (ATROVENT) 42 mcg (0.06 %) nasal spray 2 sprays by Each Nostril route 3 (three) times daily.      losartan (COZAAR) 100 MG tablet   = 1 tab, Oral, Daily, FOR BLOOD PRESSURE., # 90 tab, 3  "Refill(s), Maintenance, Pharmacy: [facility], 173, cm, 10/04/22 14:04:00 CDT, Height/Length Measured, 91.2, kg, 10/04/22 14:04:00 CDT, Weight Dosing      metFORMIN (GLUCOPHAGE-XR) 500 MG ER 24hr tablet Take 1,000 mg by mouth 2 (two) times daily.      montelukast (SINGULAIR) 10 mg tablet Take 10 mg by mouth.      multivitamin (THERAGRAN) per tablet Take 1 tablet by mouth once daily.      neomycin-polymyxin-dexamethasone (DEXACINE) 3.5 mg/g-10,000 unit/g-0.1 % Oint Place into both eyes 2 (two) times daily.      pantoprazole (PROTONIX) 40 MG tablet TAKE 1 TABLET BY MOUTH DAILY AS NEEDED HEARTBURN      pravastatin (PRAVACHOL) 40 MG tablet   See Instructions, TAKE 1 TABLET AT BEDTIME FOR CHOLESTEROL, # 90 tab, 3 Refill(s), Soft Stop, Pharmacy: [facility] #[id number], 173, cm, 08/07/20 9:51:00 CDT, Height/Length Measured, Weight Dosing      rivaroxaban (XARELTO) 15 mg Tab 15 mg.      sotalol (BETAPACE) 80 MG tablet       SYNTHROID 25 mcg tablet Take 25 mcg by mouth before breakfast.       tamsulosin (FLOMAX) 0.4 mg Cap 0.4 mg.      zinc gluconate 50 mg tablet Take 50 mg by mouth once daily.       No current facility-administered medications for this visit.     Review of patient's allergies indicates:   Allergen Reactions    Clindamycin Rash       Review of Systems   Endocrine:        >40 yrs Type II diabetes   Musculoskeletal:  Negative for gait problem.   All other systems reviewed and are negative.      Objective:      Vitals:    10/26/23 0836   BP: 132/77   Pulse: 70   Weight: 90.7 kg (200 lb)   Height: 5' 10" (1.778 m)     Physical Exam  Vitals and nursing note reviewed.   Constitutional:       General: He is not in acute distress.     Appearance: Normal appearance. He is well-developed.   Cardiovascular:      Pulses:           Dorsalis pedis pulses are 2+ on the right side and 2+ on the left side.        Posterior tibial pulses are 1+ on the right side and 1+ on the left side.   Pulmonary:      Effort: " Pulmonary effort is normal.   Musculoskeletal:      Right foot: Decreased range of motion (Osteoarthritis right foot.  Hallux limitus bilateral). Prominent metatarsal heads present.      Left foot: Decreased range of motion.   Feet:      Right foot:      Skin integrity: Dry skin (mild, improved) present.      Toenail Condition: Right toenails are abnormally thick and long. Fungal disease present.     Left foot:      Skin integrity: Dry skin present.      Toenail Condition: Left toenails are abnormally thick and long. Fungal disease present.  Skin:     Capillary Refill: Capillary refill takes 2 to 3 seconds.   Neurological:      General: No focal deficit present.      Mental Status: He is alert.   Psychiatric:         Behavior: Behavior normal.         Thought Content: Thought content normal.         Musculoskeletal   Osteoarthritis 1st MPJ R>L with limited range of motion bilateral   AJ equinus     Arthritic and degenerative changes bilateral feet    Presents in appropriate shoes                                     Assessment:       1. Pain of great toe, left    2. Hallux limitus, acquired, unspecified laterality    3. Primary osteoarthritis of right foot    4. Type II diabetes mellitus, well controlled    5. Onychomycosis of toenail            Plan:           Reviewed diabetic education  Discussed benefit of tight control of glucose/diabetes  Reviewed signs of neuropathy to monitor for  Reviewed extent of osteoarthritis, limited range of motion 1st MPJ right greater than left foot, advised this puts additional pressure on the top of the toe and the nail   Reviewed appropriate wide tennis shoes, large/wide toe box, should be worn even indoors  Reviewed OTC voltaren gel as dircted  Reviewed maintenance of dry skin, applying lotion just a few times a week   Nails debrided, thickness reduced.  Spot of blood within the right hallux nail pointed out to patient, cleansed with peroxide, triple antibiotic ointment Coban  applied.  Instructed patient as long as bandage is comfortable leave onto the morning, no further treatment should be needed.  Keep clean and dry.  Contact office immediately with any concerns.  In about 1-2 weeks I would recommend he start applying Vicks vapor rub to both big toenails twice a day, at least once daily.  Advised nails are get in worse due to fungal involvement, pain he started experience on the left is due to thickness.  Nails need to be treated to prevent complications  Reviewed need for daily foot checks and instructed patient to contact the office with any area of redness or swelling which has not improved within 3 days.  Patient was in understanding and agreement with treatment plan.  I counseled the patient on their conditions, implications and medical management.  Instructed patient to contact the office with any changes, questions, concerns, worsening of symptoms.   Total face to face time 20 minutes, exam, assessment, treatment, discussion, additional time for review of chart prior to and following appointment and visit documentation, consultation and coordination of care.   Follow up 3 months    This note was created using M*[employer] voice recognition software that occasionally misinterpreted phrases or words.

## 2024-01-26 ENCOUNTER — OFFICE VISIT (OUTPATIENT)
Dept: PODIATRY | Facility: CLINIC | Age: 84
End: 2024-01-26
Payer: MEDICARE

## 2024-01-26 VITALS
WEIGHT: 197.19 LBS | HEIGHT: 70 IN | RESPIRATION RATE: 16 BRPM | HEART RATE: 71 BPM | BODY MASS INDEX: 28.23 KG/M2 | DIASTOLIC BLOOD PRESSURE: 71 MMHG | SYSTOLIC BLOOD PRESSURE: 113 MMHG

## 2024-01-26 DIAGNOSIS — E11.9 COMPREHENSIVE DIABETIC FOOT EXAMINATION, TYPE 2 DM, ENCOUNTER FOR: Primary | ICD-10-CM

## 2024-01-26 DIAGNOSIS — E11.9 TYPE II DIABETES MELLITUS, WELL CONTROLLED: ICD-10-CM

## 2024-01-26 DIAGNOSIS — B35.1 ONYCHOMYCOSIS OF TOENAIL: ICD-10-CM

## 2024-01-26 DIAGNOSIS — M20.5X9 HALLUX LIMITUS, ACQUIRED, UNSPECIFIED LATERALITY: ICD-10-CM

## 2024-01-26 DIAGNOSIS — M77.51 BURSITIS OF RIGHT FOOT: ICD-10-CM

## 2024-01-26 DIAGNOSIS — M19.071 PRIMARY OSTEOARTHRITIS OF RIGHT FOOT: ICD-10-CM

## 2024-01-26 DIAGNOSIS — L85.3 DRY SKIN: ICD-10-CM

## 2024-01-26 PROCEDURE — 99214 OFFICE O/P EST MOD 30 MIN: CPT | Mod: PBBFAC | Performed by: PODIATRIST

## 2024-01-26 PROCEDURE — 99999 PR PBB SHADOW E&M-EST. PATIENT-LVL IV: CPT | Mod: PBBFAC,,, | Performed by: PODIATRIST

## 2024-01-26 PROCEDURE — 99214 OFFICE O/P EST MOD 30 MIN: CPT | Mod: S$PBB,,, | Performed by: PODIATRIST

## 2024-01-26 RX ORDER — FLUTICASONE PROPIONATE 50 MCG
2 SPRAY, SUSPENSION (ML) NASAL
COMMUNITY
Start: 2024-01-03

## 2024-01-26 RX ORDER — EMPAGLIFLOZIN 25 MG/1
25 TABLET, FILM COATED ORAL EVERY MORNING
COMMUNITY
Start: 2023-12-20

## 2024-01-26 NOTE — PROGRESS NOTES
Subjective:       Patient ID: Aaron Johnson is a 83 y.o. male.    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 Patient relates tender area on the outside bottom of the right foot.  Was really sore about a week ago.  [place] it was due to his slippers, stopped wearing them and is improving but sore upon pressure.  Relates otherwise his feet have been doing well, denies burning or tingling  Reports glucose was 141 this morning and last A1c 7.1      Past Medical History:   Diagnosis Date    Arthritis     Atrial fibrillation     Basal cell carcinoma     Diabetes mellitus     GERD (gastroesophageal reflux disease)     Hyperlipidemia     Hypertension     Low testosterone     Pacemaker     Thyroid disease      Past Surgical History:   Procedure Laterality Date    EYE SURGERY      Cataract     History reviewed. No pertinent family history.      Current Outpatient Medications   Medication Sig Dispense Refill    ACCU-CHEK GUIDE Strp TEST BLOOD SUGAR BID  2    albuterol (PROVENTIL/VENTOLIN HFA) 90 mcg/actuation inhaler 1 puff.      amlodipine (NORVASC) 5 MG tablet Take 5 mg by mouth once daily.       calcium carbonate (OS-VIRGILIO) 600 mg (1,500 mg) Tab Take 600 mg by mouth once daily.      cholecalciferol, vitamin D3, 125 mcg (5,000 unit) Tab Take 5,000 Units by mouth once daily.      empagliflozin-metformin (SYNJARDY XR) 10-1,000 mg TBph Take 1 tablet by mouth 2 (two) times a day. Patient takes 1 tablet am 1 tablet at night      famotidine (PEPCID) 20 MG tablet SMARTSI Tablet(s) By Mouth Every Evening      fluticasone propionate (FLONASE) 50 mcg/actuation nasal spray 2 sprays by Each Nostril route.      hydroCHLOROthiazide (HYDRODIURIL) 12.5 MG Tab 12.5 mg.      ipratropium (ATROVENT) 42 mcg (0.06 %) nasal spray 2 sprays by Each Nostril route 3 (three) times daily.      JARDIANCE  "25 mg tablet Take 25 mg by mouth every morning.      losartan (COZAAR) 100 MG tablet   = 1 tab, Oral, Daily, FOR BLOOD PRESSURE., # 90 tab, 3 Refill(s), Maintenance, Pharmacy: [facility], 173, cm, 10/04/22 14:04:00 CDT, Height/Length Measured, 91.2, kg, 10/04/22 14:04:00 CDT, Weight Dosing      metFORMIN (GLUCOPHAGE-XR) 500 MG ER 24hr tablet Take 1,000 mg by mouth 2 (two) times daily.      montelukast (SINGULAIR) 10 mg tablet Take 10 mg by mouth.      multivitamin (THERAGRAN) per tablet Take 1 tablet by mouth once daily.      neomycin-polymyxin-dexamethasone (DEXACINE) 3.5 mg/g-10,000 unit/g-0.1 % Oint Place into both eyes 2 (two) times daily.      pantoprazole (PROTONIX) 40 MG tablet TAKE 1 TABLET BY MOUTH DAILY AS NEEDED HEARTBURN      pravastatin (PRAVACHOL) 40 MG tablet   See Instructions, TAKE 1 TABLET AT BEDTIME FOR CHOLESTEROL, # 90 tab, 3 Refill(s), Soft Stop, Pharmacy: [id number], 173, cm, 08/07/20 9:51:00 CDT, Height/Length Measured, Weight Dosing      rivaroxaban (XARELTO) 15 mg Tab 15 mg.      sotalol (BETAPACE) 80 MG tablet       SYNTHROID 25 mcg tablet Take 25 mcg by mouth before breakfast.       tamsulosin (FLOMAX) 0.4 mg Cap 0.4 mg.      zinc gluconate 50 mg tablet Take 50 mg by mouth once daily.       No current facility-administered medications for this visit.     Review of patient's allergies indicates:   Allergen Reactions    Clindamycin Rash       Review of Systems   Endocrine:        >40 yrs Type II diabetes   Musculoskeletal:  Negative for gait problem.   All other systems reviewed and are negative.      Objective:      Vitals:    01/26/24 0836   BP: 113/71   Pulse: 71   Resp: 16   Weight: 89.4 kg (197 lb 3.2 oz)   Height: 5' 10" (1.778 m)     Physical Exam  Vitals and nursing note reviewed.   Constitutional:       General: He is not in acute distress.     Appearance: Normal appearance. He is well-developed.   Cardiovascular:      Pulses:           Dorsalis pedis pulses " are 2+ on the right side and 2+ on the left side.        Posterior tibial pulses are 1+ on the right side and 1+ on the left side.   Pulmonary:      Effort: Pulmonary effort is normal.   Musculoskeletal:         General: Swelling and tenderness present.      Right foot: Decreased range of motion (Osteoarthritis right foot.  Hallux limitus bilateral). Prominent metatarsal heads present.      Left foot: Decreased range of motion.      Comments: Mildly tender area of edema/bursitis involving plantar styloid process right foot, no calor   Feet:      Right foot:      Protective Sensation: 6 sites tested.  6 sites sensed.      Skin integrity: Dry skin present. No skin breakdown.      Toenail Condition: Right toenails are abnormally thick and long. Fungal disease present.     Left foot:      Protective Sensation: 6 sites tested.  6 sites sensed.      Skin integrity: Dry skin present. No skin breakdown.      Toenail Condition: Left toenails are abnormally thick and long. Fungal disease present.  Skin:     Capillary Refill: Capillary refill takes 2 to 3 seconds.   Neurological:      General: No focal deficit present.      Mental Status: He is alert.      Comments: Sensation intact all areas bilateral feet with monofilament testing, no paresthesias bilateral feet   Psychiatric:         Behavior: Behavior normal.         Thought Content: Thought content normal.         Musculoskeletal   Osteoarthritis 1st MPJ R>L with limited range of motion bilateral   AJ equinus     Arthritic and degenerative changes bilateral feet    Presents in appropriate tennis shoes                       Assessment:       1. Comprehensive diabetic foot examination, type 2 DM, encounter for    2. Type II diabetes mellitus, well controlled    3. Bursitis of right foot    4. Primary osteoarthritis of right foot    5. Hallux limitus, acquired, unspecified laterality    6. Dry skin    7. Onychomycosis of toenail            Plan:         Comprehensive  diabetic foot exam performed  Reviewed results monofilament testing, good sensation, good sign diabetes has been well-controlled over duration of time he has had this disease.  We reviewed neuropathy, symptoms to monitor for  Reviewed diabetic education  Discussed benefit of tight continued tight control of glucose/diabetes to avoid complications regarding skin problems/healing and avoid complications with neuropathy  Discussed with patient bursitis, inflammation involving the styloid process on the bottom of the right foot.  Advised patient it is still swollen, there is no warmth and it is improving most likely due to the change in shoes.  Advised with his foot type and structure, arthritis, diabetes he should not wear slippers at all.  We discussed appropriate supportive house shoe used indoors  Advised patient he can use over-the-counter Voltaren gel applied 3 times daily to this area for any residual discomfort until pain-free  Reviewed osteoarthritis, limited range of motion 1st MPJ right greater than left foot, and again reviewed appropriate tennis shoes to accommodate for arthritis  Reviewed better care and maintenance of dry skin and potential complications especially on the heels.  Discussed multiple over-the-counter treatments, apply lotion daily  Discussed care and maintenance of fungal nails.  Nails debrided, thickness reduced.    Reviewed need for daily foot checks and instructed patient to contact the office with any area of redness or swelling which has not improved in a few days.  Patient was in understanding and agreement with treatment plan.  I counseled the patient on their conditions, implications and medical management.  Instructed patient to contact the office with any changes, questions, concerns, worsening of symptoms.   Total face to face time 30 minutes, exam, assessment, treatment, discussion, additional time for review of chart prior to and following appointment and visit documentation,  consultation and coordination of care.   Follow up 3 months    This note was created using [email or web address] voice recognition software that occasionally misinterpreted phrases or words.

## 2024-03-28 ENCOUNTER — OFFICE VISIT (OUTPATIENT)
Dept: PODIATRY | Facility: CLINIC | Age: 84
End: 2024-03-28
Payer: MEDICARE

## 2024-03-28 VITALS
BODY MASS INDEX: 28.21 KG/M2 | SYSTOLIC BLOOD PRESSURE: 136 MMHG | WEIGHT: 197.06 LBS | HEART RATE: 70 BPM | OXYGEN SATURATION: 96 % | HEIGHT: 70 IN | DIASTOLIC BLOOD PRESSURE: 74 MMHG

## 2024-03-28 DIAGNOSIS — M20.5X9 HALLUX LIMITUS, ACQUIRED, UNSPECIFIED LATERALITY: ICD-10-CM

## 2024-03-28 DIAGNOSIS — M19.071 PRIMARY OSTEOARTHRITIS OF RIGHT FOOT: Primary | ICD-10-CM

## 2024-03-28 DIAGNOSIS — E11.9 TYPE II DIABETES MELLITUS, WELL CONTROLLED: ICD-10-CM

## 2024-03-28 DIAGNOSIS — B35.1 ONYCHOMYCOSIS OF TOENAIL: ICD-10-CM

## 2024-03-28 PROCEDURE — 99215 OFFICE O/P EST HI 40 MIN: CPT | Mod: PBBFAC | Performed by: PODIATRIST

## 2024-03-28 PROCEDURE — 99213 OFFICE O/P EST LOW 20 MIN: CPT | Mod: S$PBB,,, | Performed by: PODIATRIST

## 2024-03-28 PROCEDURE — 99999 PR PBB SHADOW E&M-EST. PATIENT-LVL V: CPT | Mod: PBBFAC,,, | Performed by: PODIATRIST

## 2024-03-28 RX ORDER — FAMOTIDINE 40 MG/1
40 TABLET, FILM COATED ORAL NIGHTLY
COMMUNITY
Start: 2024-02-22

## 2024-03-28 RX ORDER — AZELASTINE 1 MG/ML
SPRAY, METERED NASAL
COMMUNITY
Start: 2024-03-08

## 2024-03-30 NOTE — PROGRESS NOTES
Subjective:       Patient ID: Aaron Johnson is a 83 y.o. male.    Chief Complaint: Diabetes Mellitus and Follow-up  Patient with diabetes presents for follow-up bursitis pain right foot.  Patient relates with change in shoes this area slowly improved.  Has no pain in this location today.  Does get occasional achy pain right big toe which he knows is due to arthritis.  Denies burning or tingling in feet  Reports diabetes has been doing fairly well in the low 7.0 range, glucose was 131 this morning          Past Medical History:   Diagnosis Date    Arthritis     Atrial fibrillation     Basal cell carcinoma     Diabetes mellitus     GERD (gastroesophageal reflux disease)     Hyperlipidemia     Hypertension     Low testosterone     Pacemaker     Thyroid disease      Past Surgical History:   Procedure Laterality Date    EYE SURGERY      Cataract     History reviewed. No pertinent family history.      Current Outpatient Medications   Medication Sig Dispense Refill    ACCU-CHEK GUIDE Strp TEST BLOOD SUGAR BID  2    albuterol (PROVENTIL/VENTOLIN HFA) 90 mcg/actuation inhaler 1 puff.      amlodipine (NORVASC) 5 MG tablet Take 5 mg by mouth once daily.       azelastine (ASTELIN) 137 mcg (0.1 %) nasal spray SMARTSI-2 Spray(s) Both Nares Twice Daily PRN      calcium carbonate (OS-VIRGILIO) 600 mg (1,500 mg) Tab Take 600 mg by mouth once daily.      cholecalciferol, vitamin D3, 125 mcg (5,000 unit) Tab Take 5,000 Units by mouth once daily.      empagliflozin-metformin (SYNJARDY XR) 10-1,000 mg TBph Take 1 tablet by mouth 2 (two) times a day. Patient takes 1 tablet am 1 tablet at night      famotidine (PEPCID) 20 MG tablet SMARTSI Tablet(s) By Mouth Every Evening      famotidine (PEPCID) 40 MG tablet Take 40 mg by mouth every evening.      fluticasone propionate (FLONASE) 50 mcg/actuation nasal spray 2 sprays by Each Nostril route.      hydroCHLOROthiazide (HYDRODIURIL) 12.5 MG Tab 12.5 mg.      ipratropium (ATROVENT) 42  "mcg (0.06 %) nasal spray 2 sprays by Each Nostril route 3 (three) times daily.      JARDIANCE 25 mg tablet Take 25 mg by mouth every morning.      losartan (COZAAR) 100 MG tablet   = 1 tab, Oral, Daily, FOR BLOOD PRESSURE., # 90 tab, 3 Refill(s), Maintenance, Pharmacy: [facility], 173, cm, 10/04/22 14:04:00 CDT, Height/Length Measured, 91.2, kg, 10/04/22 14:04:00 CDT, Weight Dosing      metFORMIN (GLUCOPHAGE-XR) 500 MG ER 24hr tablet Take 1,000 mg by mouth 2 (two) times daily.      montelukast (SINGULAIR) 10 mg tablet Take 10 mg by mouth.      multivitamin (THERAGRAN) per tablet Take 1 tablet by mouth once daily.      neomycin-polymyxin-dexamethasone (DEXACINE) 3.5 mg/g-10,000 unit/g-0.1 % Oint Place into both eyes 2 (two) times daily.      pantoprazole (PROTONIX) 40 MG tablet TAKE 1 TABLET BY MOUTH DAILY AS NEEDED HEARTBURN      pravastatin (PRAVACHOL) 40 MG tablet   See Instructions, TAKE 1 TABLET AT BEDTIME FOR CHOLESTEROL, # 90 tab, 3 Refill(s), Soft Stop, Pharmacy: [facility] #[id number], 173, cm, 08/07/20 9:51:00 CDT, Height/Length Measured, Weight Dosing      rivaroxaban (XARELTO) 15 mg Tab 15 mg.      sotalol (BETAPACE) 80 MG tablet       SYNTHROID 25 mcg tablet Take 25 mcg by mouth before breakfast.       tamsulosin (FLOMAX) 0.4 mg Cap 0.4 mg.      zinc gluconate 50 mg tablet Take 50 mg by mouth once daily.       No current facility-administered medications for this visit.     Review of patient's allergies indicates:   Allergen Reactions    Clindamycin Rash       Review of Systems   Endocrine:        >40 yrs Type II diabetes   Musculoskeletal:  Negative for gait problem.   All other systems reviewed and are negative.      Objective:      Vitals:    03/28/24 0830   BP: 136/74   Pulse: 70   SpO2: 96%   Weight: 89.4 kg (197 lb 1.5 oz)   Height: 5' 10" (1.778 m)     Physical Exam  Vitals and nursing note reviewed.   Constitutional:       Appearance: Normal appearance. He is well-developed. "   Cardiovascular:      Pulses:           Dorsalis pedis pulses are 2+ on the right side and 2+ on the left side.        Posterior tibial pulses are 1+ on the right side and 1+ on the left side.   Pulmonary:      Effort: Pulmonary effort is normal.   Musculoskeletal:         General: No tenderness.      Right foot: Decreased range of motion (Osteoarthritis right foot.  Hallux limitus bilateral). Prominent metatarsal heads present.      Left foot: Decreased range of motion.   Feet:      Right foot:      Skin integrity: Dry skin present.      Toenail Condition: Right toenails are abnormally thick. Fungal disease present.     Left foot:      Skin integrity: Dry skin present.      Toenail Condition: Left toenails are abnormally thick. Fungal disease present.  Skin:     Capillary Refill: Capillary refill takes 2 to 3 seconds.   Neurological:      General: No focal deficit present.   Psychiatric:         Thought Content: Thought content normal.         Musculoskeletal   Osteoarthritis 1st MPJ R>L with limited range of motion bilateral   AJ equinus     Arthritic and degenerative changes bilateral feet    Presents in appropriate tennis shoes                             Assessment:       1. Primary osteoarthritis of right foot    2. Hallux limitus, acquired, unspecified laterality    3. Type II diabetes mellitus, well controlled    4. Onychomycosis of toenail              Plan:         Reviewed location of previous bursitis, monitor for any changes, at risk for recurrence  Reviewed arthritis, limited range of motion 1st MPJ right greater than left foot  Reviewed appropriate shoes indoors and out to help support, cushion this area which allows plenty of room for the front of the foot  Utilize Voltaren gel as directed for any recurrence of pain in this location or any joint pain regarding arthritis of the big toe  Reviewed diabetic education  Discussed benefit of tight continued tight control of glucose/diabetes   Reviewed  better care and maintenance of dry skin and fungal nails.  Nails debrided, thickness reduced.    Reviewed daily foot checks and instructed to contact the office with any area of redness or swelling which has not improved in a few days.  Patient was in understanding and agreement with treatment plan.  I counseled the patient on their conditions, implications and medical management.  Instructed patient to contact the office with any changes, questions, concerns, worsening of symptoms.   Total face to face time 20 minutes, exam, assessment, treatment, discussion, additional time for review of chart prior to and following appointment and visit documentation, consultation and coordination of care.   Follow up as needed    This note was created using [employer] voice recognition software that occasionally misinterpreted phrases or words.

## 2024-06-28 ENCOUNTER — OFFICE VISIT (OUTPATIENT)
Dept: PODIATRY | Facility: CLINIC | Age: 84
End: 2024-06-28
Payer: MEDICARE

## 2024-06-28 VITALS
BODY MASS INDEX: 28.27 KG/M2 | HEIGHT: 70 IN | DIASTOLIC BLOOD PRESSURE: 68 MMHG | HEART RATE: 69 BPM | SYSTOLIC BLOOD PRESSURE: 120 MMHG | WEIGHT: 197.5 LBS

## 2024-06-28 DIAGNOSIS — M20.5X9 HALLUX LIMITUS, ACQUIRED, UNSPECIFIED LATERALITY: Primary | ICD-10-CM

## 2024-06-28 DIAGNOSIS — E11.9 TYPE II DIABETES MELLITUS, WELL CONTROLLED: ICD-10-CM

## 2024-06-28 DIAGNOSIS — B35.1 ONYCHOMYCOSIS OF TOENAIL: ICD-10-CM

## 2024-06-28 DIAGNOSIS — M19.071 PRIMARY OSTEOARTHRITIS OF RIGHT FOOT: ICD-10-CM

## 2024-06-28 PROCEDURE — 99213 OFFICE O/P EST LOW 20 MIN: CPT | Mod: S$PBB,,, | Performed by: PODIATRIST

## 2024-06-28 PROCEDURE — 99999 PR PBB SHADOW E&M-EST. PATIENT-LVL V: CPT | Mod: PBBFAC,,, | Performed by: PODIATRIST

## 2024-06-28 PROCEDURE — 99215 OFFICE O/P EST HI 40 MIN: CPT | Mod: PBBFAC | Performed by: PODIATRIST

## 2024-06-29 NOTE — PROGRESS NOTES
Subjective:       Patient ID: Aaron Johnson is a 84 y.o. male.    Chief Complaint: Follow-up, Diabetes Mellitus, and Nail Problem  Patient with diabetes presents for follow-up limited joint range of motion 1st MPJ right, osteoarthritis right foot, bursitis pain right foot.  Patient relates this area on the ball of the right foot has done extremely well with change in shoes and he has not developed any further callus.  Wears tennis shoes indoors and out.  Denies burning or tingling in feet  Has been diabetic for 40 years, patient relates he completely tries to avoid carbohydrates in his always pretty much managed his diabetes well. Reports A1c 7.0 range        Past Medical History:   Diagnosis Date    Arthritis     Atrial fibrillation     Basal cell carcinoma     Diabetes mellitus     GERD (gastroesophageal reflux disease)     Hyperlipidemia     Hypertension     Low testosterone     Pacemaker     Thyroid disease      Past Surgical History:   Procedure Laterality Date    EYE SURGERY      Cataract     No family history on file.      Current Outpatient Medications   Medication Sig Dispense Refill    ACCU-CHEK GUIDE Strp TEST BLOOD SUGAR BID  2    albuterol (PROVENTIL/VENTOLIN HFA) 90 mcg/actuation inhaler 1 puff.      amlodipine (NORVASC) 5 MG tablet Take 5 mg by mouth once daily.       azelastine (ASTELIN) 137 mcg (0.1 %) nasal spray SMARTSI-2 Spray(s) Both Nares Twice Daily PRN      calcium carbonate (OS-VIRGILIO) 600 mg (1,500 mg) Tab Take 600 mg by mouth once daily.      cholecalciferol, vitamin D3, 125 mcg (5,000 unit) Tab Take 5,000 Units by mouth once daily.      empagliflozin-metformin (SYNJARDY XR) 10-1,000 mg TBph Take 1 tablet by mouth 2 (two) times a day. Patient takes 1 tablet am 1 tablet at night      famotidine (PEPCID) 20 MG tablet SMARTSI Tablet(s) By Mouth Every Evening      famotidine (PEPCID) 40 MG tablet Take 40 mg by mouth every evening.      fluticasone propionate (FLONASE) 50  "mcg/actuation nasal spray 2 sprays by Each Nostril route.      hydroCHLOROthiazide (HYDRODIURIL) 12.5 MG Tab 12.5 mg.      ipratropium (ATROVENT) 42 mcg (0.06 %) nasal spray 2 sprays by Each Nostril route 3 (three) times daily.      JARDIANCE 25 mg tablet Take 25 mg by mouth every morning.      losartan (COZAAR) 100 MG tablet   = 1 tab, Oral, Daily, FOR BLOOD PRESSURE., # 90 tab, 3 Refill(s), Maintenance, Pharmacy: [facility], 173, cm, 10/04/22 14:04:00 CDT, Height/Length Measured, 91.2, kg, 10/04/22 14:04:00 CDT, Weight Dosing      metFORMIN (GLUCOPHAGE-XR) 500 MG ER 24hr tablet Take 1,000 mg by mouth 2 (two) times daily.      montelukast (SINGULAIR) 10 mg tablet Take 10 mg by mouth.      multivitamin (THERAGRAN) per tablet Take 1 tablet by mouth once daily.      neomycin-polymyxin-dexamethasone (DEXACINE) 3.5 mg/g-10,000 unit/g-0.1 % Oint Place into both eyes 2 (two) times daily.      pantoprazole (PROTONIX) 40 MG tablet TAKE 1 TABLET BY MOUTH DAILY AS NEEDED HEARTBURN      pravastatin (PRAVACHOL) 40 MG tablet   See Instructions, TAKE 1 TABLET AT BEDTIME FOR CHOLESTEROL, # 90 tab, 3 Refill(s), Soft Stop, Pharmacy: [facility] #[id number], 173, cm, 08/07/20 9:51:00 CDT, Height/Length Measured, Weight Dosing      rivaroxaban (XARELTO) 15 mg Tab 15 mg.      sotalol (BETAPACE) 80 MG tablet       SYNTHROID 25 mcg tablet Take 25 mcg by mouth before breakfast.       tamsulosin (FLOMAX) 0.4 mg Cap 0.4 mg.      zinc gluconate 50 mg tablet Take 50 mg by mouth once daily.       No current facility-administered medications for this visit.     Review of patient's allergies indicates:   Allergen Reactions    Clindamycin Rash       Review of Systems   Endocrine:        >40 yrs Type II diabetes   Musculoskeletal:  Negative for gait problem.   All other systems reviewed and are negative.      Objective:      Vitals:    06/28/24 0908   BP: 120/68   Pulse: 69   Weight: 89.6 kg (197 lb 8 oz)   Height: 5' 10" (1.778 m) "     Physical Exam  Vitals and nursing note reviewed.   Constitutional:       Appearance: Normal appearance. He is well-developed.   Cardiovascular:      Pulses:           Dorsalis pedis pulses are 2+ on the right side and 2+ on the left side.        Posterior tibial pulses are 1+ on the right side and 1+ on the left side.   Pulmonary:      Effort: Pulmonary effort is normal.   Musculoskeletal:         General: No swelling.      Right foot: Decreased range of motion (Osteoarthritis right foot.  Hallux limitus bilateral). Prominent metatarsal heads present.      Left foot: Decreased range of motion.      Comments: Limited mobility   Feet:      Right foot:      Skin integrity: Dry skin present.      Toenail Condition: Right toenails are abnormally thick. Fungal disease present.     Left foot:      Skin integrity: Dry skin present.      Toenail Condition: Left toenails are abnormally thick. Fungal disease present.  Skin:     Capillary Refill: Capillary refill takes 2 to 3 seconds.   Neurological:      General: No focal deficit present.   Psychiatric:         Thought Content: Thought content normal.         Musculoskeletal   Osteoarthritis 1st MPJ R>L with limited range of motion bilateral   AJ equinus     Arthritic and degenerative changes bilateral feet    Presents in appropriate tennis shoes               Assessment:       1. Hallux limitus, acquired, unspecified laterality    2. Type II diabetes mellitus, well controlled    3. Primary osteoarthritis of right foot    4. Onychomycosis of toenail              Plan:         Reviewed arthritis, limited range of motion 1st MPJ right greater than left foot  Continue wide appropriate tennis shoes indoors with thickened sole for shock absorption worn indoors and out to both support his feet, a accommodate for arthritis right foot and protect his feet due to diabetes  Reviewed Voltaren gel as directed for any recurrence of pain in this location or any joint pain regarding  arthritis of the big toe  Reviewed diabetic education  Discussed benefit of continued tight control of daily glucose  Reviewed better care and maintenance of dry skin and fungal nails.    Tryapply lotion to his feet just a few times a week  Nails debrided, thickness reduced.    Reviewed daily foot checks and instructed to contact the office with any area of redness or swelling which has not improved in a few days.  Patient was in understanding and agreement with treatment plan.  I counseled the patient on their conditions, implications and medical management.  Instructed patient to contact the office with any changes, questions, concerns, worsening of symptoms.   Total face to face time 20 minutes, exam, assessment, treatment, discussion, additional time for review of chart prior to and following appointment and visit documentation, consultation and coordination of care.   Follow up as needed    This note was created using [employer] voice recognition software that occasionally misinterpreted phrases or words.

## 2024-10-04 ENCOUNTER — OFFICE VISIT (OUTPATIENT)
Dept: PODIATRY | Facility: CLINIC | Age: 84
End: 2024-10-04
Payer: MEDICARE

## 2024-10-04 VITALS
DIASTOLIC BLOOD PRESSURE: 77 MMHG | HEART RATE: 71 BPM | WEIGHT: 201.31 LBS | SYSTOLIC BLOOD PRESSURE: 153 MMHG | BODY MASS INDEX: 28.82 KG/M2 | RESPIRATION RATE: 18 BRPM | HEIGHT: 70 IN

## 2024-10-04 DIAGNOSIS — M19.071 PRIMARY OSTEOARTHRITIS OF RIGHT FOOT: ICD-10-CM

## 2024-10-04 DIAGNOSIS — E11.9 TYPE II DIABETES MELLITUS, WELL CONTROLLED: ICD-10-CM

## 2024-10-04 DIAGNOSIS — B35.1 ONYCHOMYCOSIS OF TOENAIL: ICD-10-CM

## 2024-10-04 DIAGNOSIS — L85.3 DRY SKIN: ICD-10-CM

## 2024-10-04 DIAGNOSIS — S99.922S: Primary | ICD-10-CM

## 2024-10-04 PROCEDURE — 99999 PR PBB SHADOW E&M-EST. PATIENT-LVL V: CPT | Mod: PBBFAC,,, | Performed by: PODIATRIST

## 2024-10-04 PROCEDURE — 99215 OFFICE O/P EST HI 40 MIN: CPT | Mod: PBBFAC | Performed by: PODIATRIST

## 2024-10-04 RX ORDER — ALPRAZOLAM 0.5 MG/1
TABLET ORAL
COMMUNITY
Start: 2024-04-01

## 2024-10-04 RX ORDER — BLOOD-GLUCOSE METER
EACH MISCELLANEOUS
COMMUNITY
Start: 2024-05-15

## 2024-10-04 RX ORDER — NIRMATRELVIR AND RITONAVIR 150-100 MG
2 KIT ORAL 2 TIMES DAILY
COMMUNITY
Start: 2024-08-20 | End: 2024-10-04

## 2024-10-04 NOTE — PROGRESS NOTES
Subjective:       Patient ID: Aaron Johnson is a 84 y.o. male.    Chief Complaint: Follow-up, Skin Problem, Nail Problem, and Diabetes Mellitus  Patient with diabetes presents for follow-up limited joint range of motion 1st MPJ right, osteoarthritis right foot, bursitis pain right foot.    Relates scraping the left great toe, fell out doors recently, tripped over a slipper.  Denies injury and states toe is healing well  Relates diabetes has remained well-controlled with A1c 7.0 range   Denies burning or tingling in feet  Has been diabetic for 40 years      Past Medical History:   Diagnosis Date    Arthritis     Atrial fibrillation     Basal cell carcinoma     Diabetes mellitus     GERD (gastroesophageal reflux disease)     Hyperlipidemia     Hypertension     Low testosterone     Pacemaker     Thyroid disease      Past Surgical History:   Procedure Laterality Date    EYE SURGERY      Cataract     No family history on file.      Current Outpatient Medications   Medication Sig Dispense Refill    ACCU-CHEK GUIDE GLUCOSE METER Misc USE AS DIRECTED THREE TIMES DAILY      ACCU-CHEK GUIDE Strp TEST BLOOD SUGAR BID  2    albuterol (PROVENTIL/VENTOLIN HFA) 90 mcg/actuation inhaler 1 puff.      ALPRAZolam (XANAX) 0.5 MG tablet See Instructions, 1-2 prior to mri, # 5 tab, 0 Refill(s), Maintenance, Pharmacy: [facility] #[id number], 173, cm, 24 14:23:00 CST, Height/Length Measured, 88.3, kg, 24 14:23:00 CST, Weight Dosing      amlodipine (NORVASC) 5 MG tablet Take 5 mg by mouth once daily.       azelastine (ASTELIN) 137 mcg (0.1 %) nasal spray SMARTSI-2 Spray(s) Both Nares Twice Daily PRN      calcium carbonate (OS-VIRGILIO) 600 mg (1,500 mg) Tab Take 600 mg by mouth once daily.      cholecalciferol, vitamin D3, 125 mcg (5,000 unit) Tab Take 5,000 Units by mouth once daily.      empagliflozin-metformin (SYNJARDY XR) 10-1,000 mg TBph Take 1 tablet by mouth 2 (two) times a day. Patient takes 1 tablet am 1  tablet at night      fluticasone propionate (FLONASE) 50 mcg/actuation nasal spray 2 sprays by Each Nostril route.      hydroCHLOROthiazide (HYDRODIURIL) 12.5 MG Tab 12.5 mg.      ipratropium (ATROVENT) 42 mcg (0.06 %) nasal spray 2 sprays by Each Nostril route 3 (three) times daily.      JARDIANCE 25 mg tablet Take 25 mg by mouth every morning.      losartan (COZAAR) 100 MG tablet   = 1 tab, Oral, Daily, FOR BLOOD PRESSURE., # 90 tab, 3 Refill(s), Maintenance, Pharmacy: [facility], 173, cm, 10/04/22 14:04:00 CDT, Height/Length Measured, 91.2, kg, 10/04/22 14:04:00 CDT, Weight Dosing      metFORMIN (GLUCOPHAGE-XR) 500 MG ER 24hr tablet Take 1,000 mg by mouth 2 (two) times daily.      montelukast (SINGULAIR) 10 mg tablet Take 10 mg by mouth.      multivitamin (THERAGRAN) per tablet Take 1 tablet by mouth once daily.      neomycin-polymyxin-dexamethasone (DEXACINE) 3.5 mg/g-10,000 unit/g-0.1 % Oint Place into both eyes 2 (two) times daily.      pantoprazole (PROTONIX) 40 MG tablet TAKE 1 TABLET BY MOUTH DAILY AS NEEDED HEARTBURN      pravastatin (PRAVACHOL) 40 MG tablet   See Instructions, TAKE 1 TABLET AT BEDTIME FOR CHOLESTEROL, # 90 tab, 3 Refill(s), Soft Stop, Pharmacy: [facility] #[id number], 173, cm, 20 9:51:00 CDT, Height/Length Measured, Weight Dosing      rivaroxaban (XARELTO) 15 mg Tab 15 mg.      sotalol (BETAPACE) 80 MG tablet       SYNTHROID 25 mcg tablet Take 25 mcg by mouth before breakfast.       tamsulosin (FLOMAX) 0.4 mg Cap 0.4 mg.      zinc gluconate 50 mg tablet Take 50 mg by mouth once daily.      famotidine (PEPCID) 20 MG tablet SMARTSI Tablet(s) By Mouth Every Evening      famotidine (PEPCID) 40 MG tablet Take 40 mg by mouth every evening.       No current facility-administered medications for this visit.     Review of patient's allergies indicates:   Allergen Reactions    Clindamycin Rash       Review of Systems   Endocrine:        >40 yrs Type II diabetes  "  Musculoskeletal:  Negative for gait problem.   All other systems reviewed and are negative.      Objective:      Vitals:    10/04/24 0917   BP: (!) 153/77   Pulse: 71   Resp: 18   Weight: 91.3 kg (201 lb 4.8 oz)   Height: 5' 10" (1.778 m)     Physical Exam  Vitals and nursing note reviewed.   Constitutional:       Appearance: Normal appearance. He is well-developed.   Cardiovascular:      Pulses:           Dorsalis pedis pulses are 2+ on the right side and 2+ on the left side.        Posterior tibial pulses are 1+ on the right side and 1+ on the left side.   Pulmonary:      Effort: Pulmonary effort is normal.   Musculoskeletal:         General: No tenderness.      Right foot: Decreased range of motion (Osteoarthritis right foot.  Hallux limitus bilateral). Prominent metatarsal heads present.      Left foot: Decreased range of motion.      Comments: Limited mobility   Feet:      Right foot:      Skin integrity: Erythema (Dry abrasion medial left hallux with minimal erythema and no calor) and dry skin present.      Toenail Condition: Right toenails are abnormally thick. Fungal disease present.     Left foot:      Skin integrity: Dry skin present.      Toenail Condition: Left toenails are abnormally thick. Fungal disease present.  Skin:     Capillary Refill: Capillary refill takes 2 to 3 seconds.   Neurological:      General: No focal deficit present.   Psychiatric:         Thought Content: Thought content normal.                            Assessment:       1. Injury of toe, left, sequela    2. Type II diabetes mellitus, well controlled    3. Dry skin    4. Primary osteoarthritis of right foot    5. Onychomycosis of toenail            Plan:         Advised patient to watch area of abrasion left great toe  Area is clean and dry but there is some redness  Reassured patient there is no warmth, no skin break but monitor area for any change.  Contact office immediately with any concerns  We did review better care and " maintenance of dry skin  Reviewed arthritis, limited range of motion 1st MPJ right greater than left foot  Continue wide appropriate tennis shoes, should be worn indoors as well to protect feet, avoid slippers.  Patient already through slippers out which caused recent fall  Reviewed diabetic education  Reviewed care and maintenance of fungal nails.    Nails debrided, thickness reduced.    Reviewed daily foot checks and instructed to contact the office with any area of redness or swelling which has not improved in a few days.  Patient was in understanding and agreement with treatment plan.  I counseled the patient on their conditions, implications and medical management.  Instructed patient to contact the office with any changes, questions, concerns, worsening of symptoms.   Total face to face time 20 minutes, exam, assessment, treatment, discussion, additional time for review of chart prior to and following appointment and visit documentation, consultation and coordination of care.   Follow up as needed    This note was created using [employer] voice recognition software that occasionally misinterpreted phrases or words.

## 2025-01-10 ENCOUNTER — OFFICE VISIT (OUTPATIENT)
Dept: PODIATRY | Facility: CLINIC | Age: 85
End: 2025-01-10
Payer: MEDICARE

## 2025-01-10 VITALS
WEIGHT: 200 LBS | BODY MASS INDEX: 28.63 KG/M2 | HEIGHT: 70 IN | RESPIRATION RATE: 18 BRPM | DIASTOLIC BLOOD PRESSURE: 75 MMHG | SYSTOLIC BLOOD PRESSURE: 130 MMHG | HEART RATE: 85 BPM

## 2025-01-10 DIAGNOSIS — L85.3 DRY SKIN: ICD-10-CM

## 2025-01-10 DIAGNOSIS — B35.1 ONYCHOMYCOSIS OF TOENAIL: ICD-10-CM

## 2025-01-10 DIAGNOSIS — E11.9 TYPE II DIABETES MELLITUS, WELL CONTROLLED: ICD-10-CM

## 2025-01-10 DIAGNOSIS — M19.071 PRIMARY OSTEOARTHRITIS OF RIGHT FOOT: ICD-10-CM

## 2025-01-10 DIAGNOSIS — M20.5X9 HALLUX LIMITUS, ACQUIRED, UNSPECIFIED LATERALITY: ICD-10-CM

## 2025-01-10 DIAGNOSIS — E11.9 COMPREHENSIVE DIABETIC FOOT EXAMINATION, TYPE 2 DM, ENCOUNTER FOR: Primary | ICD-10-CM

## 2025-01-10 PROCEDURE — 99214 OFFICE O/P EST MOD 30 MIN: CPT | Mod: S$PBB,,, | Performed by: PODIATRIST

## 2025-01-10 PROCEDURE — 99999 PR PBB SHADOW E&M-EST. PATIENT-LVL IV: CPT | Mod: PBBFAC,,, | Performed by: PODIATRIST

## 2025-01-10 PROCEDURE — 99214 OFFICE O/P EST MOD 30 MIN: CPT | Mod: PBBFAC | Performed by: PODIATRIST

## 2025-01-10 RX ORDER — EMPAGLIFLOZIN, METFORMIN HYDROCHLORIDE 12.5; 1 MG/1; MG/1
1 TABLET, EXTENDED RELEASE ORAL 2 TIMES DAILY
COMMUNITY

## 2025-01-10 RX ORDER — HYDROCHLOROTHIAZIDE 12.5 MG/1
12.5 TABLET ORAL
COMMUNITY
Start: 2024-12-19

## 2025-01-10 RX ORDER — PRAVASTATIN SODIUM 40 MG/1
TABLET ORAL
COMMUNITY
Start: 2024-09-23

## 2025-01-10 RX ORDER — AMLODIPINE BESYLATE 5 MG/1
TABLET ORAL
COMMUNITY
Start: 2024-11-10

## 2025-01-10 RX ORDER — PANTOPRAZOLE SODIUM 40 MG/1
40 TABLET, DELAYED RELEASE ORAL
COMMUNITY
Start: 2024-12-19

## 2025-01-12 NOTE — PROGRESS NOTES
Subjective:       Patient ID: Aaron Johnson is a 84 y.o. male.    Chief Complaint: Diabetic Foot Exam  Patient presents for annual diabetic foot exam, follow-up limited joint range of motion 1st MPJ right, osteoarthritis right foot  Has a 40+ year history of diabetes.  Relates it has pretty much been well-controlled throughout the duration, he monitors what he eats  Reports current A1c 6.8  Denies burning or tingling in feet  History of foot sores or infections  Arthritis causes him majority of problems in his feet  No foot pain today    Past Medical History:   Diagnosis Date    Arthritis     Atrial fibrillation     Basal cell carcinoma     Diabetes mellitus     GERD (gastroesophageal reflux disease)     Hyperlipidemia     Hypertension     Low testosterone     Pacemaker     Thyroid disease      Past Surgical History:   Procedure Laterality Date    EYE SURGERY      Cataract     No family history on file.      Current Outpatient Medications   Medication Sig Dispense Refill    ACCU-CHEK GUIDE Strp TEST BLOOD SUGAR BID  2    amLODIPine (NORVASC) 5 MG tablet = 1 tab, Oral, Daily, # 90 tab, 3 Refill(s), Maintenance, Pharmacy: [facility] #[id number], 173, cm, 10/29/24 13:54:00 CDT, Height/Length Measured, 90.6, kg, 10/29/24 13:54:00 CDT, Weight Dosing      famotidine (PEPCID) 40 MG tablet Take 40 mg by mouth every evening.      hydroCHLOROthiazide (HYDRODIURIL) 12.5 MG Tab 12.5 mg.      multivitamin (THERAGRAN) per tablet Take 1 tablet by mouth once daily.      pantoprazole (PROTONIX) 40 MG tablet 40 mg.      pravastatin (PRAVACHOL) 40 MG tablet = 1 tab, Oral, HS, # 90 tab, 3 Refill(s), Maintenance, Pharmacy: [facility] #[id number], 173, cm, 08/26/24 11:25:00 CDT, Height/Length Measured, 89.5, kg, 08/26/24 11:25:00 CDT, Weight Dosing      rivaroxaban (XARELTO) 15 mg Tab = 1 tab, Oral, qPM, # 90 tab, 1 Refill(s), Maintenance, Pharmacy: [id number], 173, cm, 10/29/24 13:54:00 CDT,  "Height/Length Measured, 90.6, kg, 10/29/24 13:54:00 CDT, Weight Dosing      sotalol (BETAPACE) 80 MG tablet       SYNJARDY XR 12.5-1,000 mg TBph Take 1 tablet by mouth 2 (two) times daily.      SYNTHROID 25 mcg tablet Take 25 mcg by mouth before breakfast.        No current facility-administered medications for this visit.     Review of patient's allergies indicates:   Allergen Reactions    Clindamycin Rash       Review of Systems   Cardiovascular:  Negative for leg swelling.   Endocrine:        >40 yrs Type II diabetes   Musculoskeletal:  Negative for gait problem.   All other systems reviewed and are negative.      Objective:      Vitals:    01/10/25 0923   BP: 130/75   Pulse: 85   Resp: 18   Weight: 90.7 kg (200 lb)   Height: 5' 10" (1.778 m)     Physical Exam  Vitals and nursing note reviewed.   Constitutional:       General: He is not in acute distress.     Appearance: Normal appearance. He is well-developed.   Cardiovascular:      Pulses:           Dorsalis pedis pulses are 2+ on the right side and 2+ on the left side.        Posterior tibial pulses are 1+ on the right side and 1+ on the left side.   Pulmonary:      Effort: Pulmonary effort is normal.   Musculoskeletal:         General: No tenderness.      Right foot: Decreased range of motion (Osteoarthritis right foot.  Hallux limitus bilateral). Prominent metatarsal heads present.      Left foot: Decreased range of motion.      Comments: Limited mobility   Feet:      Right foot:      Protective Sensation: 5 sites tested.  5 sites sensed.      Skin integrity: Dry skin present.      Toenail Condition: Right toenails are abnormally thick. Fungal disease present.     Left foot:      Protective Sensation: 5 sites tested.  5 sites sensed.      Skin integrity: Dry skin present.      Toenail Condition: Left toenails are abnormally thick. Fungal disease present.  Skin:     Capillary Refill: Capillary refill takes 2 to 3 seconds.   Neurological:      General: No " focal deficit present.      Mental Status: He is alert.      Comments: Sensation intact all areas bilateral feet with monofilament testing   Psychiatric:         Thought Content: Thought content normal.                   Assessment:       1. Comprehensive diabetic foot examination, type 2 DM, encounter for    2. Type II diabetes mellitus, well controlled    3. Primary osteoarthritis of right foot    4. Hallux limitus, acquired, unspecified laterality    5. Dry skin    6. Onychomycosis of toenail              Plan:               Comprehensive diabetic pedal exam performed.    Reviewed good sensation in feet   Reviewed A1c, excellent   Reviewed diabetic education  Reviewed signs of neuropathy to monitor for especially due to the length of time he has been diabetic   Reviewed benefit of controled glucose/diabetes regarding potential foot problems  Reviewed arthritis, limited range of motion 1st MPJ right greater than left foot  Continue wide appropriate tennis shoes, should be worn indoors as well to protect feet, no slippers at all  Reviewed diabetic education  Reviewed better care and maintenance of dry skin, potential complications  Reviewed better care and maintenance of fungal nails, potential complications   Nails debrided, thickness reduced.    Reviewed daily foot checks and instructed to contact the office with any area of redness or swelling which has not improved in a few days.  Patient was in understanding and agreement with treatment plan.  I counseled the patient on their conditions, implications and medical management.  Instructed patient to contact the office with any changes, questions, concerns, worsening of symptoms.   Total face to face time 30 minutes, exam, assessment, treatment, discussion, additional time for review of chart prior to and following appointment and visit documentation, consultation and coordination of care.   Follow up 3 months    This note was created using [employer] voice  recognition software that occasionally misinterpreted phrases or words.

## 2025-04-17 ENCOUNTER — OFFICE VISIT (OUTPATIENT)
Dept: PODIATRY | Facility: CLINIC | Age: 85
End: 2025-04-17
Payer: MEDICARE

## 2025-04-17 VITALS
SYSTOLIC BLOOD PRESSURE: 140 MMHG | RESPIRATION RATE: 18 BRPM | HEIGHT: 70 IN | BODY MASS INDEX: 28.63 KG/M2 | WEIGHT: 200 LBS | HEART RATE: 69 BPM | DIASTOLIC BLOOD PRESSURE: 65 MMHG

## 2025-04-17 DIAGNOSIS — B35.1 ONYCHOMYCOSIS OF TOENAIL: ICD-10-CM

## 2025-04-17 DIAGNOSIS — E11.9 TYPE II DIABETES MELLITUS, WELL CONTROLLED: ICD-10-CM

## 2025-04-17 DIAGNOSIS — L85.3 DRY SKIN: ICD-10-CM

## 2025-04-17 DIAGNOSIS — M19.071 PRIMARY OSTEOARTHRITIS OF RIGHT FOOT: ICD-10-CM

## 2025-04-17 DIAGNOSIS — M20.5X9 HALLUX LIMITUS, ACQUIRED, UNSPECIFIED LATERALITY: Primary | ICD-10-CM

## 2025-04-17 PROCEDURE — 99214 OFFICE O/P EST MOD 30 MIN: CPT | Mod: PBBFAC | Performed by: PODIATRIST

## 2025-04-17 PROCEDURE — 99999 PR PBB SHADOW E&M-EST. PATIENT-LVL IV: CPT | Mod: PBBFAC,,, | Performed by: PODIATRIST

## 2025-04-17 PROCEDURE — 99213 OFFICE O/P EST LOW 20 MIN: CPT | Mod: S$PBB,,, | Performed by: PODIATRIST

## 2025-04-17 RX ORDER — AMLODIPINE BESYLATE 5 MG/1
TABLET ORAL
COMMUNITY
Start: 2025-03-10

## 2025-04-17 RX ORDER — FAMOTIDINE 40 MG/1
TABLET, FILM COATED ORAL
COMMUNITY
Start: 2025-03-24

## 2025-04-17 RX ORDER — HYDROCHLOROTHIAZIDE 12.5 MG/1
TABLET ORAL
COMMUNITY
Start: 2025-01-13

## 2025-04-17 RX ORDER — AZITHROMYCIN 250 MG/1
TABLET, FILM COATED ORAL
COMMUNITY
Start: 2025-03-17

## 2025-04-17 RX ORDER — SOTALOL HYDROCHLORIDE 80 MG/1
TABLET ORAL
COMMUNITY
Start: 2025-01-18

## 2025-04-17 RX ORDER — MONTELUKAST SODIUM 10 MG/1
10 TABLET ORAL
COMMUNITY
Start: 2025-02-21

## 2025-04-17 NOTE — PROGRESS NOTES
Subjective:       Patient ID: Aaron Johnson is a 85 y.o. male.    Chief Complaint: Follow-up, Nail Problem, Skin Problem, and Diabetes Mellitus  Patient presents for follow-up type 2 diabetes, limited joint range of motion 1st MPJ right, osteoarthritis right foot  Relates diabetes has been high this week, 140/150 and was 190 this morning  His endocrinologist retired and his PCP changed his medication since he was very well-controlled.  She put him on a different medication once a day and he was previously taking Jardiance twice a day  He will see his PCP for follow-up in 5 days and does have an appointment to establish care with Shawna Gagnon NP endocrinology and a few months  Has been very active this week working out side in the flower beds  No foot complaints this morning    Past Medical History:   Diagnosis Date    Arthritis     Atrial fibrillation     Basal cell carcinoma     Diabetes mellitus     GERD (gastroesophageal reflux disease)     Hyperlipidemia     Hypertension     Low testosterone     Pacemaker     Thyroid disease      Past Surgical History:   Procedure Laterality Date    EYE SURGERY      Cataract     No family history on file.      Current Outpatient Medications   Medication Sig Dispense Refill    amLODIPine (NORVASC) 5 MG tablet = 1 tab, Oral, HS, # 90 tab, 3 Refill(s), Maintenance, Pharmacy: [email or web address] #[id number], 173, cm, 01/02/25 13:44:00 CST, Height/Length Measured, 91.4, kg, 01/02/25 13:44:00 CST, Weight Dosing (Patient not taking: Reported on 4/17/2025)      azithromycin (Z-DRAGAN) 250 MG tablet Take by mouth. (Patient not taking: Reported on 4/17/2025)      empagliflozin (JARDIANCE) 25 mg tablet 25 mg.      famotidine (PEPCID) 40 MG tablet = 1 tab, Oral, HS, # 90 tab, 3 Refill(s), Maintenance, Pharmacy: [facility] #[id number], 173, cm, 03/17/25 14:35:00 CDT, Height/Length Measured, 90.9, kg, 03/17/25 14:35:00 CDT, Weight Dosing (Patient not taking: Reported on 4/17/2025)       hydroCHLOROthiazide 12.5 MG Tab = 1 tab, Oral, Daily, # 90 tab, 3 Refill(s), Maintenance, Pharmacy: [facility] #[id number], 173, cm, 01/02/25 13:44:00 CST, Height/Length Measured, 91.4, kg, 01/02/25 13:44:00 CST, Weight Dosing      montelukast (SINGULAIR) 10 mg tablet Take 10 mg by mouth.      sotaloL (BETAPACE) 80 MG tablet = 0.5 tab, Oral, BID, # 90 tab, 1 Refill(s), Maintenance, Pharmacy: [facility] #[id number], 173, cm, 01/02/25 13:44:00 CST, Height/Length Measured, 91.4, kg, 01/02/25 13:44:00 CST, Weight Dosing      ACCU-CHEK GUIDE Strp TEST BLOOD SUGAR BID  2    amLODIPine (NORVASC) 5 MG tablet = 1 tab, Oral, Daily, # 90 tab, 3 Refill(s), Maintenance, Pharmacy: [facility] #[id number], 173, cm, 10/29/24 13:54:00 CDT, Height/Length Measured, 90.6, kg, 10/29/24 13:54:00 CDT, Weight Dosing      famotidine (PEPCID) 40 MG tablet Take 40 mg by mouth every evening.      hydroCHLOROthiazide (HYDRODIURIL) 12.5 MG Tab 12.5 mg. (Patient not taking: Reported on 4/17/2025)      losartan (COZAAR) 100 MG tablet       multivitamin (THERAGRAN) per tablet Take 1 tablet by mouth once daily.      pantoprazole (PROTONIX) 40 MG tablet 40 mg.      pravastatin (PRAVACHOL) 40 MG tablet = 1 tab, Oral, HS, # 90 tab, 3 Refill(s), Maintenance, Pharmacy: [facility] #[id number], 173, cm, 08/26/24 11:25:00 CDT, Height/Length Measured, 89.5, kg, 08/26/24 11:25:00 CDT, Weight Dosing      rivaroxaban (XARELTO) 15 mg Tab = 1 tab, Oral, qPM, # 90 tab, 1 Refill(s), Maintenance, Pharmacy: [id number], 173, cm, 10/29/24 13:54:00 CDT, Height/Length Measured, 90.6, kg, 10/29/24 13:54:00 CDT, Weight Dosing      sotalol (BETAPACE) 80 MG tablet  (Patient not taking: Reported on 4/17/2025)      SYNJARDY XR 12.5-1,000 mg TBph Take 1 tablet by mouth 2 (two) times daily.      SYNTHROID 25 mcg tablet Take 25 mcg by mouth before breakfast.        No current facility-administered medications for this visit.     Review  of patient's allergies indicates:   Allergen Reactions    Clindamycin Rash       Review of Systems   Endocrine:        >40 yrs Type II diabetes   Musculoskeletal:  Negative for gait problem.   All other systems reviewed and are negative.      Objective:      There were no vitals filed for this visit.    Physical Exam  Vitals and nursing note reviewed.   Constitutional:       General: He is not in acute distress.     Appearance: Normal appearance. He is well-developed.   Cardiovascular:      Pulses:           Dorsalis pedis pulses are 2+ on the right side and 2+ on the left side.        Posterior tibial pulses are 1+ on the right side and 1+ on the left side.   Musculoskeletal:         General: No tenderness.      Right foot: Decreased range of motion (Osteoarthritis right foot.  Hallux limitus bilateral). Prominent metatarsal heads present.      Left foot: Decreased range of motion.      Comments: Limited mobility   Feet:      Right foot:      Skin integrity: Dry skin present.      Toenail Condition: Right toenails are abnormally thick. Fungal disease present.     Left foot:      Skin integrity: Dry skin present.      Toenail Condition: Left toenails are abnormally thick. Fungal disease present.  Skin:     Capillary Refill: Capillary refill takes 2 to 3 seconds.   Neurological:      General: No focal deficit present.      Mental Status: He is alert.                         Assessment:       1. Hallux limitus, acquired, unspecified laterality    2. Primary osteoarthritis of right foot    3. Dry skin    4. Type II diabetes mellitus, well controlled    5. Onychomycosis of toenail                Plan:           Reviewed arthritis, limited range of motion 1st MPJ right greater than left foot  Always wear wide appropriate tennis shoes  Advised patient a tennis shoe with thicker sole for shock absorption will offload arthritic pressure on the joint better  Good shoes should be worn indoors as well to protect feet, no  slippers at all    Reviewed diabetic education pertaining to feet  Reviewed much improved dry skin, still recommended to apply lotion each night  Reviewed potential skin complications  Reviewed better care and maintenance of fungal nails, potential complications   Nails debrided, thickness reduced.    Reviewed daily foot checks and instructed to contact the office with any area of redness or swelling which has not improved in a few days.  Patient was in understanding and agreement with treatment plan.  I counseled the patient on their conditions, implications and medical management.  Instructed patient to contact the office with any changes, questions, concerns, worsening of symptoms.   Total face to face time 20 minutes, exam, assessment, treatment, discussion, additional time for review of chart prior to and following appointment and visit documentation, consultation and coordination of care.   Follow up 3 months    This note was created using [employer] voice recognition software that occasionally misinterpreted phrases or words.

## 2025-07-25 ENCOUNTER — OFFICE VISIT (OUTPATIENT)
Dept: PODIATRY | Facility: CLINIC | Age: 85
End: 2025-07-25
Payer: MEDICARE

## 2025-07-25 VITALS
WEIGHT: 200 LBS | HEART RATE: 70 BPM | DIASTOLIC BLOOD PRESSURE: 64 MMHG | BODY MASS INDEX: 28.7 KG/M2 | SYSTOLIC BLOOD PRESSURE: 140 MMHG

## 2025-07-25 DIAGNOSIS — M19.071 PRIMARY OSTEOARTHRITIS OF RIGHT FOOT: Primary | ICD-10-CM

## 2025-07-25 DIAGNOSIS — B35.1 ONYCHOMYCOSIS OF TOENAIL: ICD-10-CM

## 2025-07-25 DIAGNOSIS — E11.9 TYPE II DIABETES MELLITUS, WELL CONTROLLED: ICD-10-CM

## 2025-07-25 DIAGNOSIS — L85.3 DRY SKIN: ICD-10-CM

## 2025-07-25 PROCEDURE — 99213 OFFICE O/P EST LOW 20 MIN: CPT | Mod: S$PBB,,, | Performed by: PODIATRIST

## 2025-07-25 PROCEDURE — 99214 OFFICE O/P EST MOD 30 MIN: CPT | Mod: PBBFAC | Performed by: PODIATRIST

## 2025-07-25 PROCEDURE — 99999 PR PBB SHADOW E&M-EST. PATIENT-LVL IV: CPT | Mod: PBBFAC,,, | Performed by: PODIATRIST

## 2025-07-25 RX ORDER — SEMAGLUTIDE 0.68 MG/ML
INJECTION, SOLUTION SUBCUTANEOUS
COMMUNITY

## 2025-07-26 NOTE — PROGRESS NOTES
Subjective:       Patient ID: Aaron Johnson is a 85 y.o. male.    Chief Complaint: Diabetes Mellitus and Follow-up  Patient with type 2 diabetes presents for follow-up limited joint range of motion 1st MPJ right, osteoarthritis right foot  Patient relates his feet have been doing well, no recurrence of callus on the ball of the right foot  Reports glucose 129 this morning, does state it goes up a little in the middle of the day but has been doing much better with the addition of the lowest dose of Ozempic    Past Medical History:   Diagnosis Date    Arthritis     Atrial fibrillation     Basal cell carcinoma     Diabetes mellitus     GERD (gastroesophageal reflux disease)     Hyperlipidemia     Hypertension     Low testosterone     Pacemaker     Thyroid disease      Past Surgical History:   Procedure Laterality Date    EYE SURGERY      Cataract     No family history on file.      Current Outpatient Medications   Medication Sig Dispense Refill    ACCU-CHEK GUIDE Strp TEST BLOOD SUGAR BID  2    amLODIPine (NORVASC) 5 MG tablet = 1 tab, Oral, Daily, # 90 tab, 3 Refill(s), Maintenance, Pharmacy: [facility] #[id number], 173, cm, 10/29/24 13:54:00 CDT, Height/Length Measured, 90.6, kg, 10/29/24 13:54:00 CDT, Weight Dosing      hydroCHLOROthiazide (HYDRODIURIL) 12.5 MG Tab 12.5 mg.      losartan (COZAAR) 100 MG tablet       montelukast (SINGULAIR) 10 mg tablet Take 10 mg by mouth.      multivitamin (THERAGRAN) per tablet Take 1 tablet by mouth once daily.      OZEMPIC 0.25 mg or 0.5 mg (2 mg/3 mL) pen injector INJECT 0.25MG UNDER SKIN EVERY WEEK      pravastatin (PRAVACHOL) 40 MG tablet = 1 tab, Oral, HS, # 90 tab, 3 Refill(s), Maintenance, Pharmacy: [facility] #[id number], 173, cm, 08/26/24 11:25:00 CDT, Height/Length Measured, 89.5, kg, 08/26/24 11:25:00 CDT, Weight Dosing      rivaroxaban (XARELTO) 15 mg Tab = 1 tab, Oral, qPM, # 90 tab, 1 Refill(s), Maintenance, Pharmacy: [facility]  #[id number], 173, cm, 10/29/24 13:54:00 CDT, Height/Length Measured, 90.6, kg, 10/29/24 13:54:00 CDT, Weight Dosing      sotalol (BETAPACE) 80 MG tablet       sotaloL (BETAPACE) 80 MG tablet = 0.5 tab, Oral, BID, # 90 tab, 1 Refill(s), Maintenance, Pharmacy: [facility] #[id number], 173, cm, 01/02/25 13:44:00 CST, Height/Length Measured, 91.4, kg, 01/02/25 13:44:00 CST, Weight Dosing      SYNJARDY XR 12.5-1,000 mg TBph Take 1 tablet by mouth 2 (two) times daily.      SYNTHROID 25 mcg tablet Take 25 mcg by mouth before breakfast.       amLODIPine (NORVASC) 5 MG tablet = 1 tab, Oral, HS, # 90 tab, 3 Refill(s), Maintenance, Pharmacy: [facility] #[id number], 173, cm, 01/02/25 13:44:00 CST, Height/Length Measured, 91.4, kg, 01/02/25 13:44:00 CST, Weight Dosing (Patient not taking: Reported on 7/25/2025)      azithromycin (Z-DRAGAN) 250 MG tablet Take by mouth. (Patient not taking: Reported on 7/25/2025)      famotidine (PEPCID) 40 MG tablet Take 40 mg by mouth every evening. (Patient not taking: Reported on 7/25/2025)      famotidine (PEPCID) 40 MG tablet = 1 tab, Oral, HS, # 90 tab, 3 Refill(s), Maintenance, Pharmacy: [facility] #[id number], 173, cm, 03/17/25 14:35:00 CDT, Height/Length Measured, 90.9, kg, 03/17/25 14:35:00 CDT, Weight Dosing (Patient not taking: Reported on 7/25/2025)      hydroCHLOROthiazide 12.5 MG Tab = 1 tab, Oral, Daily, # 90 tab, 3 Refill(s), Maintenance, Pharmacy: [facility] #[id number], 173, cm, 01/02/25 13:44:00 CST, Height/Length Measured, 91.4, kg, 01/02/25 13:44:00 CST, Weight Dosing (Patient not taking: Reported on 7/25/2025)      pantoprazole (PROTONIX) 40 MG tablet 40 mg. (Patient not taking: Reported on 7/25/2025)       No current facility-administered medications for this visit.     Review of patient's allergies indicates:   Allergen Reactions    Clindamycin Rash       Review of Systems   Endocrine:        >40 yrs Type II diabetes   Musculoskeletal:  Negative for gait problem.    All other systems reviewed and are negative.      Objective:      Vitals:    07/25/25 0844   BP: (!) 140/64   Pulse: 70   Weight: 90.7 kg (200 lb)       Physical Exam  Vitals and nursing note reviewed.   Constitutional:       Appearance: Normal appearance. He is well-developed.   Cardiovascular:      Pulses:           Dorsalis pedis pulses are 2+ on the right side and 2+ on the left side.        Posterior tibial pulses are 1+ on the right side and 1+ on the left side.   Musculoskeletal:      Right foot: Decreased range of motion (Osteoarthritis right foot.  Hallux limitus bilateral). Prominent metatarsal heads present.      Left foot: Decreased range of motion.      Comments: Limited mobility   Feet:      Right foot:      Skin integrity: Dry skin (Increase dry skin texture) present.      Toenail Condition: Right toenails are abnormally thick. Fungal disease present.     Left foot:      Skin integrity: Dry skin present.      Toenail Condition: Left toenails are abnormally thick. Fungal disease present.  Skin:     Capillary Refill: Capillary refill takes 2 to 3 seconds.   Neurological:      General: No focal deficit present.      Mental Status: He is alert.                       Assessment:       1. Primary osteoarthritis of right foot    2. Dry skin    3. Type II diabetes mellitus, well controlled    4. Onychomycosis of toenail                  Plan:           Reviewed arthritis, stiffness of toes and forefoot  Reviewed wear wide appropriate tennis shoes, should have thick sole for shock absorption, light weight, canvas, breathable  We discussed using a running tennis shoe  Discussed use of Voltaren gel for any joint pain, use this over-the-counter product as directed  Advised patient skin is very dry on the bottom of the feet and a especially around the tips of the toes of the nails especially the big toes  Instructed patient to start applying any type of cream lotion or moisturizer to his feet at night, this will  allow it to thoroughly absorbed into the skin and dry before applying socks and shoes in the morning  Discussed potential complications with dry cracked or peeling skin  Reviewed diabetic education pertaining to feet  Reviewed better care and maintenance of fungal nails, potential complications   Nails debrided, thickness reduced.    Reviewed daily foot checks and instructed to contact the office with any area of redness or swelling which has not improved in a few days.  Patient was in understanding and agreement with treatment plan.  I counseled the patient on their conditions, implications and medical management.  Instructed patient to contact the office with any changes, questions, concerns, worsening of symptoms.   Total face to face time 20 minutes, exam, assessment, treatment, discussion, additional time for review of chart prior to and following appointment and visit documentation, consultation and coordination of care.   Follow up 3 months    This note was created using M*Modal voice recognition software that occasionally misinterpreted phrases or words.